# Patient Record
Sex: FEMALE | Race: WHITE | NOT HISPANIC OR LATINO | Employment: FULL TIME | URBAN - METROPOLITAN AREA
[De-identification: names, ages, dates, MRNs, and addresses within clinical notes are randomized per-mention and may not be internally consistent; named-entity substitution may affect disease eponyms.]

---

## 2009-07-22 LAB — EXTERNAL HIV SCREEN: NORMAL

## 2019-07-30 RX ORDER — ESCITALOPRAM OXALATE 10 MG/1
10 TABLET ORAL DAILY
COMMUNITY
Start: 2019-05-30 | End: 2020-04-20 | Stop reason: ALTCHOICE

## 2019-07-30 RX ORDER — LISINOPRIL 10 MG/1
10 TABLET ORAL DAILY
COMMUNITY
Start: 2019-03-01 | End: 2020-08-17 | Stop reason: SDUPTHER

## 2019-07-30 RX ORDER — PNV NO.95/FERROUS FUM/FOLIC AC 28MG-0.8MG
2 TABLET ORAL DAILY
COMMUNITY

## 2019-07-31 ENCOUNTER — OFFICE VISIT (OUTPATIENT)
Dept: FAMILY MEDICINE CLINIC | Facility: CLINIC | Age: 44
End: 2019-07-31
Payer: COMMERCIAL

## 2019-07-31 VITALS
RESPIRATION RATE: 12 BRPM | OXYGEN SATURATION: 99 % | WEIGHT: 196.8 LBS | DIASTOLIC BLOOD PRESSURE: 72 MMHG | SYSTOLIC BLOOD PRESSURE: 124 MMHG | TEMPERATURE: 98.8 F | BODY MASS INDEX: 32.79 KG/M2 | HEIGHT: 65 IN | HEART RATE: 76 BPM

## 2019-07-31 DIAGNOSIS — I10 ESSENTIAL HYPERTENSION: ICD-10-CM

## 2019-07-31 DIAGNOSIS — E04.2 MULTIPLE THYROID NODULES: ICD-10-CM

## 2019-07-31 DIAGNOSIS — Z00.00 PHYSICAL EXAM, ANNUAL: Primary | ICD-10-CM

## 2019-07-31 DIAGNOSIS — F41.9 ANXIETY: ICD-10-CM

## 2019-07-31 PROBLEM — D68.51 FACTOR 5 LEIDEN MUTATION, HETEROZYGOUS (HCC): Status: ACTIVE | Noted: 2019-03-04

## 2019-07-31 PROCEDURE — 99396 PREV VISIT EST AGE 40-64: CPT | Performed by: FAMILY MEDICINE

## 2019-07-31 RX ORDER — DIPHENOXYLATE HYDROCHLORIDE AND ATROPINE SULFATE 2.5; .025 MG/1; MG/1
1 TABLET ORAL DAILY
COMMUNITY

## 2020-04-20 ENCOUNTER — TELEPHONE (OUTPATIENT)
Dept: ADMINISTRATIVE | Facility: OTHER | Age: 45
End: 2020-04-20

## 2020-04-20 ENCOUNTER — TELEMEDICINE (OUTPATIENT)
Dept: FAMILY MEDICINE CLINIC | Facility: CLINIC | Age: 45
End: 2020-04-20
Payer: COMMERCIAL

## 2020-04-20 ENCOUNTER — TELEPHONE (OUTPATIENT)
Dept: FAMILY MEDICINE CLINIC | Facility: CLINIC | Age: 45
End: 2020-04-20

## 2020-04-20 VITALS — TEMPERATURE: 99 F | BODY MASS INDEX: 32.65 KG/M2 | HEIGHT: 65 IN | WEIGHT: 196 LBS

## 2020-04-20 DIAGNOSIS — R50.9 FEVER, UNSPECIFIED FEVER CAUSE: ICD-10-CM

## 2020-04-20 DIAGNOSIS — R50.9 FEVER, UNSPECIFIED FEVER CAUSE: Primary | ICD-10-CM

## 2020-04-20 DIAGNOSIS — Z20.828 EXPOSURE TO SARS-ASSOCIATED CORONAVIRUS: ICD-10-CM

## 2020-04-20 PROCEDURE — 87635 SARS-COV-2 COVID-19 AMP PRB: CPT

## 2020-04-20 PROCEDURE — 99213 OFFICE O/P EST LOW 20 MIN: CPT | Performed by: FAMILY MEDICINE

## 2020-04-21 LAB — SARS-COV-2 RNA SPEC QL NAA+PROBE: NOT DETECTED

## 2020-05-12 ENCOUNTER — TELEMEDICINE (OUTPATIENT)
Dept: FAMILY MEDICINE CLINIC | Facility: CLINIC | Age: 45
End: 2020-05-12
Payer: COMMERCIAL

## 2020-05-12 VITALS — TEMPERATURE: 98.9 F | HEIGHT: 65 IN | WEIGHT: 204.5 LBS | BODY MASS INDEX: 34.07 KG/M2

## 2020-05-12 DIAGNOSIS — M54.6 ACUTE RIGHT-SIDED THORACIC BACK PAIN: Primary | ICD-10-CM

## 2020-05-12 PROCEDURE — 99213 OFFICE O/P EST LOW 20 MIN: CPT | Performed by: FAMILY MEDICINE

## 2020-05-12 RX ORDER — CYCLOBENZAPRINE HCL 10 MG
10 TABLET ORAL 3 TIMES DAILY PRN
Qty: 30 TABLET | Refills: 0 | Status: SHIPPED | OUTPATIENT
Start: 2020-05-12 | End: 2021-08-04

## 2020-06-29 ENCOUNTER — TRANSCRIBE ORDERS (OUTPATIENT)
Dept: ADMINISTRATIVE | Facility: HOSPITAL | Age: 45
End: 2020-06-29

## 2020-06-29 DIAGNOSIS — Z12.31 VISIT FOR SCREENING MAMMOGRAM: Primary | ICD-10-CM

## 2020-07-10 ENCOUNTER — HOSPITAL ENCOUNTER (OUTPATIENT)
Dept: ULTRASOUND IMAGING | Facility: MEDICAL CENTER | Age: 45
Discharge: HOME | End: 2020-07-10
Payer: COMMERCIAL

## 2020-07-10 DIAGNOSIS — E04.2 MULTIPLE THYROID NODULES: ICD-10-CM

## 2020-07-10 PROCEDURE — 76536 US EXAM OF HEAD AND NECK: CPT

## 2020-07-24 ENCOUNTER — TELEPHONE (OUTPATIENT)
Dept: FAMILY MEDICINE CLINIC | Facility: CLINIC | Age: 45
End: 2020-07-24

## 2020-07-24 NOTE — TELEPHONE ENCOUNTER
April from [facility] needs a referral for PT faxed to the office at [phone]    Cassy's appointment is next Wednesday 7/29/20    Caller: April  Phone#: [phone]

## 2020-07-27 DIAGNOSIS — M54.6 ACUTE RIGHT-SIDED THORACIC BACK PAIN: Primary | ICD-10-CM

## 2020-07-27 NOTE — TELEPHONE ENCOUNTER
Okay to place referral? Reviewed chart no information about a PT referral needing to placed  Please advise

## 2020-08-11 ENCOUNTER — OFFICE VISIT (OUTPATIENT)
Dept: FAMILY MEDICINE CLINIC | Facility: CLINIC | Age: 45
End: 2020-08-11
Payer: COMMERCIAL

## 2020-08-11 VITALS
BODY MASS INDEX: 33.15 KG/M2 | SYSTOLIC BLOOD PRESSURE: 122 MMHG | RESPIRATION RATE: 16 BRPM | WEIGHT: 199 LBS | OXYGEN SATURATION: 96 % | HEART RATE: 64 BPM | TEMPERATURE: 98 F | DIASTOLIC BLOOD PRESSURE: 80 MMHG | HEIGHT: 65 IN

## 2020-08-11 DIAGNOSIS — Z00.00 PHYSICAL EXAM, ANNUAL: Primary | ICD-10-CM

## 2020-08-11 DIAGNOSIS — I10 ESSENTIAL HYPERTENSION: ICD-10-CM

## 2020-08-11 DIAGNOSIS — F41.9 ANXIETY: ICD-10-CM

## 2020-08-11 DIAGNOSIS — D68.51 FACTOR 5 LEIDEN MUTATION, HETEROZYGOUS (HCC): ICD-10-CM

## 2020-08-11 DIAGNOSIS — E04.2 MULTIPLE THYROID NODULES: ICD-10-CM

## 2020-08-11 DIAGNOSIS — Z12.4 CERVICAL CANCER SCREENING: ICD-10-CM

## 2020-08-11 DIAGNOSIS — Z80.0 FAMILY HISTORY OF LYNCH SYNDROME: ICD-10-CM

## 2020-08-11 PROCEDURE — 99396 PREV VISIT EST AGE 40-64: CPT | Performed by: FAMILY MEDICINE

## 2020-08-11 PROCEDURE — 3074F SYST BP LT 130 MM HG: CPT | Performed by: FAMILY MEDICINE

## 2020-08-11 PROCEDURE — 3079F DIAST BP 80-89 MM HG: CPT | Performed by: FAMILY MEDICINE

## 2020-08-11 PROCEDURE — 3725F SCREEN DEPRESSION PERFORMED: CPT | Performed by: FAMILY MEDICINE

## 2020-08-11 PROCEDURE — 3008F BODY MASS INDEX DOCD: CPT | Performed by: FAMILY MEDICINE

## 2020-08-11 PROCEDURE — 1036F TOBACCO NON-USER: CPT | Performed by: FAMILY MEDICINE

## 2020-08-11 RX ORDER — ESCITALOPRAM OXALATE 10 MG/1
10 TABLET ORAL DAILY
COMMUNITY
Start: 2020-07-22 | End: 2020-08-11

## 2020-08-11 NOTE — PROGRESS NOTES
BMI Counseling: Body mass index is 33 28 kg/m²  The BMI is above normal  Nutrition recommendations include decreasing portion sizes and encouraging healthy choices of fruits and vegetables  Exercise recommendations include vigorous physical activity 75 minutes/week  Assessment/Plan:    No problem-specific Assessment & Plan notes found for this encounter  {[facility]28712}      Subjective:      Patient ID: Tarun Luo is a 40 y o  female      HPI  Pt presents for preventive exam    {Common ambulatory SmartLinks:56417}    Review of Systems      Objective:      /80   Pulse 64   Temp 98 °F (36 7 °C)   Resp 16   Ht 5' 4 84" (1 647 m)   Wt 90 3 kg (199 lb)   SpO2 96%   BMI 33 28 kg/m²          Physical Exam

## 2020-08-11 NOTE — PROGRESS NOTES
BMI Counseling: Body mass index is 33 28 kg/m²  The BMI is above normal  Nutrition recommendations include decreasing portion sizes and encouraging healthy choices of fruits and vegetables  Exercise recommendations include vigorous physical activity 75 minutes/week  Assessment/Plan:    No problem-specific Assessment & Plan notes found for this encounter  Diagnoses and all orders for this visit:    Physical exam, annual  Comments:  preventively, due for gyn--referral given  continue working on exercise and weight loss  flu shot in the fall  labs as ordered    Cervical cancer screening  -     Ambulatory referral to Obstetrics / Gynecology; Future    Anxiety  Comments:  doing well off meds  continue to monitor    Essential hypertension  Comments:  well-controlled on current meds  continue same  Orders:  -     CBC and differential; Future  -     Comprehensive metabolic panel; Future  -     Hemoglobin A1C; Future  -     Lipid panel; Future    Multiple thyroid nodules  Comments:  stable US  labs as ordered  Orders:  -     TSH, 3rd generation with Free T4 reflex; Future  -     Anti-microsomal antibody; Future    Factor 5 Leiden mutation, heterozygous ([address] )  Comments:  stable  no a/c indicated     Family history of White syndrome  Comments:  refer to genetics    Other orders  -     Discontinue: escitalopram (LEXAPRO) 10 mg tablet; Take 10 mg by mouth daily        Subjective:      Patient ID: Timo Parker is a 40 y o  female  HPI  Pt presents for preventive exam   Working on weight loss with noom  Exercising  Seeing dentist   Due for labs  Her cousin was just diagnosed with white syndrome  Her uncle had  as a young man of cancer  Pt is tolerating her lisinopril  No cough, chest pain, shortness of breath  Needs new gyn and would like referral    She stopped lexapro and is feeling well  No increased anxiety at present          The following portions of the patient's history were reviewed and updated as appropriate: allergies, current medications, past family history, past medical history, past social history, past surgical history and problem list     Review of Systems   Constitutional: Negative for chills, fatigue, fever and unexpected weight change  HENT: Negative for congestion, ear pain, hearing loss, postnasal drip, rhinorrhea, sinus pressure, sinus pain, sore throat, trouble swallowing and voice change  Eyes: Negative for pain, redness and visual disturbance  Respiratory: Negative for cough and shortness of breath  Cardiovascular: Negative for chest pain, palpitations and leg swelling  Gastrointestinal: Negative for abdominal pain, constipation, diarrhea and nausea  Endocrine: Negative for cold intolerance, heat intolerance, polydipsia and polyuria  Genitourinary: Negative for dysuria, frequency and urgency  Musculoskeletal: Negative for arthralgias, joint swelling and myalgias  Skin: Negative for rash  No suspicious lesions   Neurological: Negative for weakness, numbness and headaches  Hematological: Negative for adenopathy  Objective:      /80   Pulse 64   Temp 98 °F (36 7 °C)   Resp 16   Ht 5' 4 84" (1 647 m)   Wt 90 3 kg (199 lb)   SpO2 96%   BMI 33 28 kg/m²          Physical Exam  Constitutional:       General: She is not in acute distress  Appearance: She is well-developed  HENT:      Head: Normocephalic and atraumatic  Right Ear: Tympanic membrane, ear canal and external ear normal       Left Ear: Tympanic membrane, ear canal and external ear normal       Nose: Nose normal       Mouth/Throat:      Pharynx: No oropharyngeal exudate  Eyes:      Conjunctiva/sclera: Conjunctivae normal       Pupils: Pupils are equal, round, and reactive to light  Neck:      Thyroid: No thyromegaly  Vascular: No carotid bruit or JVD  Cardiovascular:      Rate and Rhythm: Regular rhythm  Heart sounds: S1 normal and S2 normal  No murmur  No friction rub  No gallop  No S3 or S4 sounds  Pulmonary:      Effort: Pulmonary effort is normal       Breath sounds: Normal breath sounds  No wheezing, rhonchi or rales  Abdominal:      General: Bowel sounds are normal  There is no distension  Palpations: Abdomen is soft  Tenderness: There is no abdominal tenderness  Lymphadenopathy:      Cervical: No cervical adenopathy  Neurological:      Mental Status: She is alert and oriented to person, place, and time  Cranial Nerves: No cranial nerve deficit  Sensory: No sensory deficit  Deep Tendon Reflexes: Reflexes are normal and symmetric  Psychiatric:         Mood and Affect: Mood normal          Behavior: Behavior normal          Thought Content:  Thought content normal          Judgment: Judgment normal

## 2020-08-17 ENCOUNTER — PATIENT MESSAGE (OUTPATIENT)
Dept: FAMILY MEDICINE CLINIC | Facility: CLINIC | Age: 45
End: 2020-08-17

## 2020-08-17 DIAGNOSIS — I10 ESSENTIAL HYPERTENSION: Primary | ICD-10-CM

## 2020-08-17 NOTE — TELEPHONE ENCOUNTER
From: Fabiana Burden  To: Nan Layne DO  Sent: 8/17/2020 11:38 AM EDT  Subject: Non-Urgent Medical Question    Hello,  I need a refill for my Lisinopril medication  This will be new with Dr Jonnie Montanez in the [facility] practice   Would you please send it to [address], [address] [place] [address] # [phone]

## 2020-08-17 NOTE — TELEPHONE ENCOUNTER
Medication: Lisinopril 10 mg   Last refilled: 7/30/19  Last Office Visit: 8/11/20  Next Office Visit: 8/13/21  Pharmacy:

## 2020-08-18 RX ORDER — LISINOPRIL 10 MG/1
10 TABLET ORAL DAILY
Qty: 90 TABLET | Refills: 1 | Status: SHIPPED | OUTPATIENT
Start: 2020-08-18 | End: 2020-08-19 | Stop reason: SDUPTHER

## 2020-08-19 DIAGNOSIS — I10 ESSENTIAL HYPERTENSION: ICD-10-CM

## 2020-08-19 NOTE — TELEPHONE ENCOUNTER
Medication: Lisinopril 10 mg   Last refilled: 8/18/20  Last Office Visit: 8/11/20  Next Office Visit: 8/13/21  Pharmacy:

## 2020-08-21 RX ORDER — LISINOPRIL 10 MG/1
10 TABLET ORAL DAILY
Qty: 90 TABLET | Refills: 0 | Status: SHIPPED | OUTPATIENT
Start: 2020-08-21 | End: 2021-02-26

## 2020-09-17 ENCOUNTER — PATIENT MESSAGE (OUTPATIENT)
Dept: FAMILY MEDICINE CLINIC | Facility: CLINIC | Age: 45
End: 2020-09-17

## 2020-09-17 DIAGNOSIS — Z80.9 FAMILY HISTORY OF CANCER: Primary | ICD-10-CM

## 2020-09-24 ENCOUNTER — HOSPITAL ENCOUNTER (OUTPATIENT)
Dept: MAMMOGRAPHY | Facility: MEDICAL CENTER | Age: 45
Discharge: HOME | End: 2020-09-24
Payer: COMMERCIAL

## 2020-09-24 ENCOUNTER — TELEPHONE (OUTPATIENT)
Dept: SURGICAL ONCOLOGY | Facility: CLINIC | Age: 45
End: 2020-09-24

## 2020-09-24 VITALS — WEIGHT: 199 LBS | BODY MASS INDEX: 33.15 KG/M2 | HEIGHT: 65 IN

## 2020-09-24 DIAGNOSIS — Z12.31 VISIT FOR SCREENING MAMMOGRAM: ICD-10-CM

## 2020-09-24 PROCEDURE — 77067 SCR MAMMO BI INCL CAD: CPT

## 2020-09-24 PROCEDURE — 77063 BREAST TOMOSYNTHESIS BI: CPT

## 2020-09-24 NOTE — TELEPHONE ENCOUNTER
Genetics New Patient Intake Form   Patient Details:     Ezekiel Jimenez    1975    [id number]    Background Information:    "On Hold (Urgent Slot)" is set aside for Pancreatic, Ovarian, and Scheduled Surgery Patients  If it is not scheduled by the previous Friday, any patient can be scheduled in it  Who is calling to schedule? If not self, relationship to patient? self   Referring Provider Dr Jorge Montenegro   Is this a personal or family history?  family   If personal history, what age were you at diagnosis? na   What is the type of tumor?   color[place] syndrome and colon    Pancreatic and Ovarian needs to be scheduled in the "On Hold (Urgent Slot)"   Do you have a pending surgery for your cancer diagnosis? NA    If yes: needs to be scheduled in the "On Hold (Urgent Slot)"   Referring Provider Department pcp   Did the patient schedule an appointment? Yes   List Appointment Date and Provider Name  Elysebrittany Constantin 12/16   Scheduling Information:   Preferred [place]:  [facility]   Are there any dates/time the patient cannot be seen?  na   Miscellaneous: na   After completing the above information, please route to Oncology Genetics

## 2020-10-10 ENCOUNTER — NURSE TRIAGE (OUTPATIENT)
Dept: OTHER | Facility: OTHER | Age: 45
End: 2020-10-10

## 2020-10-10 DIAGNOSIS — Z20.828 EXPOSURE TO SARS-ASSOCIATED CORONAVIRUS: Primary | ICD-10-CM

## 2020-10-12 DIAGNOSIS — Z20.828 EXPOSURE TO SARS-ASSOCIATED CORONAVIRUS: ICD-10-CM

## 2020-10-12 PROCEDURE — U0003 INFECTIOUS AGENT DETECTION BY NUCLEIC ACID (DNA OR RNA); SEVERE ACUTE RESPIRATORY SYNDROME CORONAVIRUS 2 (SARS-COV-2) (CORONAVIRUS DISEASE [COVID-19]), AMPLIFIED PROBE TECHNIQUE, MAKING USE OF HIGH THROUGHPUT TECHNOLOGIES AS DESCRIBED BY CMS-2020-01-R: HCPCS | Performed by: FAMILY MEDICINE

## 2020-10-13 LAB — SARS-COV-2 RNA SPEC QL NAA+PROBE: NOT DETECTED

## 2020-11-11 ENCOUNTER — PATIENT MESSAGE (OUTPATIENT)
Dept: FAMILY MEDICINE CLINIC | Facility: CLINIC | Age: 45
End: 2020-11-11

## 2020-11-12 LAB
ALBUMIN SERPL-MCNC: 4.1 G/DL (ref 3.6–5.1)
ALBUMIN/GLOB SERPL: 1.6 (CALC) (ref 1–2.5)
ALP SERPL-CCNC: 37 U/L (ref 31–125)
ALT SERPL-CCNC: 11 U/L (ref 6–29)
AST SERPL-CCNC: 14 U/L (ref 10–30)
BASOPHILS # BLD AUTO: 19 CELLS/UL (ref 0–200)
BASOPHILS NFR BLD AUTO: 0.4 %
BILIRUB SERPL-MCNC: 0.4 MG/DL (ref 0.2–1.2)
BUN SERPL-MCNC: 17 MG/DL (ref 7–25)
BUN/CREAT SERPL: NORMAL (CALC) (ref 6–22)
CALCIUM SERPL-MCNC: 9.2 MG/DL (ref 8.6–10.2)
CHLORIDE SERPL-SCNC: 107 MMOL/L (ref 98–110)
CHOLEST SERPL-MCNC: 161 MG/DL
CHOLEST/HDLC SERPL: 4.7 (CALC)
CO2 SERPL-SCNC: 29 MMOL/L (ref 20–32)
CREAT SERPL-MCNC: 0.89 MG/DL (ref 0.5–1.1)
EOSINOPHIL # BLD AUTO: 89 CELLS/UL (ref 15–500)
EOSINOPHIL NFR BLD AUTO: 1.9 %
ERYTHROCYTE [DISTWIDTH] IN BLOOD BY AUTOMATED COUNT: 12.9 % (ref 11–15)
GLOBULIN SER CALC-MCNC: 2.5 G/DL (CALC) (ref 1.9–3.7)
GLUCOSE SERPL-MCNC: 94 MG/DL (ref 65–99)
HBA1C MFR BLD: 5.5 % OF TOTAL HGB
HCT VFR BLD AUTO: 41.9 % (ref 35–45)
HDLC SERPL-MCNC: 34 MG/DL
HGB BLD-MCNC: 13.3 G/DL (ref 11.7–15.5)
LDLC SERPL CALC-MCNC: 106 MG/DL (CALC)
LYMPHOCYTES # BLD AUTO: 1645 CELLS/UL (ref 850–3900)
LYMPHOCYTES NFR BLD AUTO: 35 %
MCH RBC QN AUTO: 27.8 PG (ref 27–33)
MCHC RBC AUTO-ENTMCNC: 31.7 G/DL (ref 32–36)
MCV RBC AUTO: 87.5 FL (ref 80–100)
MONOCYTES # BLD AUTO: 343 CELLS/UL (ref 200–950)
MONOCYTES NFR BLD AUTO: 7.3 %
NEUTROPHILS # BLD AUTO: 2604 CELLS/UL (ref 1500–7800)
NEUTROPHILS NFR BLD AUTO: 55.4 %
NONHDLC SERPL-MCNC: 127 MG/DL (CALC)
PLATELET # BLD AUTO: 179 THOUSAND/UL (ref 140–400)
PMV BLD REES-ECKER: 13.3 FL (ref 7.5–12.5)
POTASSIUM SERPL-SCNC: 4.5 MMOL/L (ref 3.5–5.3)
PROT SERPL-MCNC: 6.6 G/DL (ref 6.1–8.1)
RBC # BLD AUTO: 4.79 MILLION/UL (ref 3.8–5.1)
SL AMB EGFR AFRICAN AMERICAN: 91 ML/MIN/1.73M2
SL AMB EGFR NON AFRICAN AMERICAN: 79 ML/MIN/1.73M2
SODIUM SERPL-SCNC: 142 MMOL/L (ref 135–146)
THYROPEROXIDASE AB SERPL-ACNC: 1 IU/ML
TRIGL SERPL-MCNC: 113 MG/DL
TSH SERPL-ACNC: 1.07 MIU/L
WBC # BLD AUTO: 4.7 THOUSAND/UL (ref 3.8–10.8)

## 2020-12-09 ENCOUNTER — VBI (OUTPATIENT)
Dept: ADMINISTRATIVE | Facility: OTHER | Age: 45
End: 2020-12-09

## 2020-12-11 ENCOUNTER — TELEPHONE (OUTPATIENT)
Dept: SURGICAL ONCOLOGY | Facility: CLINIC | Age: 45
End: 2020-12-11

## 2020-12-15 ENCOUNTER — TELEPHONE (OUTPATIENT)
Dept: SURGICAL ONCOLOGY | Facility: CLINIC | Age: 45
End: 2020-12-15

## 2020-12-16 ENCOUNTER — CLINICAL SUPPORT (OUTPATIENT)
Dept: GENETICS | Facility: CLINIC | Age: 45
End: 2020-12-16

## 2020-12-16 DIAGNOSIS — Z84.81 FHX: GENETIC DISEASE CARRIER: ICD-10-CM

## 2020-12-16 DIAGNOSIS — Z80.0 FAMILY HISTORY OF COLON CANCER: ICD-10-CM

## 2020-12-16 DIAGNOSIS — Z80.3 FAMILY HISTORY OF BREAST CANCER: Primary | ICD-10-CM

## 2020-12-16 PROCEDURE — NC001 PR NO CHARGE: Performed by: GENETIC COUNSELOR, MS

## 2021-01-11 ENCOUNTER — OFFICE VISIT (OUTPATIENT)
Dept: OBGYN CLINIC | Facility: CLINIC | Age: 46
End: 2021-01-11
Payer: COMMERCIAL

## 2021-01-11 VITALS
SYSTOLIC BLOOD PRESSURE: 126 MMHG | DIASTOLIC BLOOD PRESSURE: 74 MMHG | WEIGHT: 201 LBS | HEIGHT: 65 IN | BODY MASS INDEX: 33.49 KG/M2

## 2021-01-11 DIAGNOSIS — Z01.419 WELL FEMALE EXAM WITH ROUTINE GYNECOLOGICAL EXAM: Primary | ICD-10-CM

## 2021-01-11 DIAGNOSIS — Z12.4 ENCOUNTER FOR SCREENING FOR CERVICAL CANCER: ICD-10-CM

## 2021-01-11 PROCEDURE — 3074F SYST BP LT 130 MM HG: CPT | Performed by: OBSTETRICS & GYNECOLOGY

## 2021-01-11 PROCEDURE — 1036F TOBACCO NON-USER: CPT | Performed by: OBSTETRICS & GYNECOLOGY

## 2021-01-11 PROCEDURE — 87624 HPV HI-RISK TYP POOLED RSLT: CPT | Performed by: OBSTETRICS & GYNECOLOGY

## 2021-01-11 PROCEDURE — 3008F BODY MASS INDEX DOCD: CPT | Performed by: OBSTETRICS & GYNECOLOGY

## 2021-01-11 PROCEDURE — 99386 PREV VISIT NEW AGE 40-64: CPT | Performed by: OBSTETRICS & GYNECOLOGY

## 2021-01-11 PROCEDURE — 3078F DIAST BP <80 MM HG: CPT | Performed by: OBSTETRICS & GYNECOLOGY

## 2021-01-11 PROCEDURE — G0145 SCR C/V CYTO,THINLAYER,RESCR: HCPCS | Performed by: OBSTETRICS & GYNECOLOGY

## 2021-01-11 NOTE — PROGRESS NOTES
ASSESSMENT & PLAN: Vivian Beltre is a 39 y o  [id number] with normal gynecologic exam     1   Routine well woman exam done today  2   Pap and HPV: Pap with HPV was done today  Current ASCCP Guidelines reviewed  3   Mammogram up to date  Recommend yearly mammography  4   Family history of White syndrome: has seen genetic counseling and has not yet done testing  If positive, would need to discuss options of prophylactic surgery vs early cancer screening  5  The patient is sexually active  She relies on her partner's vasectomy for contraception and options have been discussed  6  The following were reviewed in today's visit: breast self exam, menopause, exercise and healthy diet  7  Patient to return to office in 12 months for annual exam      All questions have been answered to her satisfaction  CC:  Annual Gynecologic Examination    HPI: Vivian Beltre is a 39 y o  [id number] who presents for annual gynecologic examination  She has the following concerns:  Heavy periods  Still regular  Denies intermenstrual bleeding  She does not report hot flashes, night sweats, other symptoms of menopause  She does not report urinary incontinence  Health Maintenance:    She exercises 3 days per week - gym  She wears her seatbelt routinely  She does not perform regular monthly self breast exams  She feels safe at home  She does follow a balanced diet      She does not use tobacco    Past Medical History:   Diagnosis Date    Allergic     [place], soy, seasonal, Peanuts hurt my stomach     Allergic rhinitis     Anxiety 2004    Depression 2018    [facility], [facility]     Generalized headaches     More frequent around my period    Hypertension 2004    Low back pain 01/2017    Varicella        Past Surgical History:   Procedure Laterality Date    VAGINAL DELIVERY      WISDOM TOOTH EXTRACTION         Past OB/Gyn History:  Period Cycle (Days): 28  Period Duration (Days): 6  Period Pattern: Regular  Menstrual Flow: Heavy  Menstrual Control: Maxi pad, Thin pad  Dysmenorrhea: (!) MildPatient's last menstrual period was 2021 (exact date)      Patient is currently sexually active: heterosexual  Birth control:vasectomy      Last Pap  2013 : no abnormalities per patient  No history of abnormal paps    OB History    Para Term  AB Living   3 2 2 0 1 0   SAB TAB Ectopic Multiple Live Births   1 0 0 0 0      # Outcome Date GA Lbr Dev/2nd Weight Sex Delivery Anes PTL Lv   3 SAB            2 Term            1 Term                Family History  Family History   Problem Relation Age of Onset    Coronary artery disease Father     Diabetes Father     Hypothyroidism Mother     Breast cancer Mother 61    Clotting disorder Sister     ADD / ADHD Son     Asthma Son     No Known Problems Daughter     No Known Problems Maternal Grandmother     No Known Problems Maternal Grandfather     No Known Problems Paternal Grandmother     No Known Problems Paternal Grandfather     No Known Problems Sister     Throat cancer Maternal Aunt         age unknown    Melanoma Maternal Aunt 39    No Known Problems Maternal Aunt        Social History:  Social History     Socioeconomic History    Marital status: /Civil Union     Spouse name: Alexsander Hwang Number of children: 2    Years of education: Not on file    Highest education level: Not on file   Occupational History    Not on file   Social Needs    Financial resource strain: Not on file    Food insecurity     Worry: Not on file     Inability: Not on file   Yumit needs     Medical: Not on file     Non-medical: Not on file   Tobacco Use    Smoking status: Former Smoker     Packs/day: 0 50     Years: 4 00     Pack years: 2 00     Types: Cigarettes     Quit date: 1997     Years since quittin 0    Smokeless tobacco: Never Used   Substance and Sexual Activity    Alcohol use: Not Currently    Drug use: Never    Sexual activity: Yes     Partners: Male     Birth control/protection: Male Sterilization   Lifestyle    Physical activity     Days per week: Not on file     Minutes per session: Not on file    Stress: Not on file   Relationships    Social connections     Talks on phone: Not on file     Gets together: Not on file     Attends Alevism service: Not on file     Active member of club or organization: Not on file     Attends meetings of clubs or organizations: Not on file     Relationship status: Not on file    Intimate partner violence     Fear of current or ex partner: Not on file     Emotionally abused: Not on file     Physically abused: Not on file     Forced sexual activity: Not on file   Other Topics Concern    Not on file   Social History Narrative    Not on file     Presently lives with her  and daughter and son  Patient is   Patient is currently employed  Allergies: Allergies   Allergen Reactions    Other Hives and Other (See Comments)     Almonds - causes oral pain   Oranges - Hives    Hanover Oil Other (See Comments)    Latex        Medications:    Current Outpatient Medications:     lisinopril (ZESTRIL) 10 mg tablet, Take 1 tablet (10 mg total) by mouth daily, Disp: 90 tablet, Rfl: 0    multivitamin (THERAGRAN) TABS, Take 1 tablet by mouth daily, Disp: , Rfl:     Omega-3 Fatty Acids (FISH OIL OMEGA-3) 1000 MG CAPS, Take 2 g by mouth daily, Disp: , Rfl:     Probiotic Product (PROBIOTIC-10) CAPS, Take 1 capsule by mouth daily , Disp: , Rfl:     cyclobenzaprine (FLEXERIL) 10 mg tablet, Take 1 tablet (10 mg total) by mouth 3 (three) times a day as needed for muscle spasms (Patient not taking: Reported on 1/11/2021), Disp: 30 tablet, Rfl: 0    Review of Systems:  Denies fevers, chills, unintentional weight loss, excessive fatigue, chest pain, shortness of breath, abdominal pain, nausea, vomiting, urinary incontinence, urinary frequency, vaginal bleeding, vaginal discharge   All other systems negative unless otherwise stated  Physical Exam:  /74   Ht 5' 4 75" (1 645 m)   Wt 91 2 kg (201 lb)   LMP 01/09/2021 (Exact Date)   BMI 33 71 kg/m²  Body mass index is 33 71 kg/m²  GEN: The patient was alert and oriented x3, pleasant well-appearing female in no acute distress  HEENT:  Unremarkable, no anterior or posterior lymphadenopathy, no thyromegaly  CV:  Regular rate and rhythm, normal S1 and S2, no murmurs  RESP:  Clear to auscultation bilaterally, no wheezes, rales or rhonchi  BREAST:  Symmetric breasts with no palpable breast masses or obvious breast lesions  She has no retractions or nipple discharge  She has no axillary abnormalities or palpable masses  GI:  Soft, nontender, non-distended  MSK: bilateral lower extremities are nontender, no edema  : Normal appearing external female genitalia, normal appearing urethral meatus  On sterile speculum exam, normal appearing vaginal epithelium, no vaginal discharge, no bleeding, grossly normal appearing cervix  On bimanual exam, no cervical motion tenderness; uterus is smooth, mobile, nontender 6 weeks size  No tenderness or fullness in the bilateral adnexa

## 2021-01-15 ENCOUNTER — IMMUNIZATIONS (OUTPATIENT)
Dept: FAMILY MEDICINE CLINIC | Facility: HOSPITAL | Age: 46
End: 2021-01-15

## 2021-01-15 DIAGNOSIS — Z23 ENCOUNTER FOR IMMUNIZATION: Primary | ICD-10-CM

## 2021-01-15 LAB
LAB AP GYN PRIMARY INTERPRETATION: NORMAL
Lab: NORMAL

## 2021-01-15 PROCEDURE — 0001A SARS-COV-2 / COVID-19 MRNA VACCINE (PFIZER-BIONTECH) 30 MCG: CPT

## 2021-01-15 PROCEDURE — 91300 SARS-COV-2 / COVID-19 MRNA VACCINE (PFIZER-BIONTECH) 30 MCG: CPT

## 2021-01-16 LAB
HPV HR 12 DNA CVX QL NAA+PROBE: NEGATIVE
HPV16 DNA CVX QL NAA+PROBE: NEGATIVE
HPV18 DNA CVX QL NAA+PROBE: NEGATIVE

## 2021-01-27 ENCOUNTER — PATIENT MESSAGE (OUTPATIENT)
Dept: FAMILY MEDICINE CLINIC | Facility: CLINIC | Age: 46
End: 2021-01-27

## 2021-01-28 NOTE — TELEPHONE ENCOUNTER
From: Bree [place] LPN  To: Jayro Daley  Sent: 1/27/2021 11:56 AM EST  Subject: Influenza Vaccine     Hello,     We hope you are doing well  While it is important to receive your flu vaccine yearly, our records show that you have not yet received yours for this year  Please call the office at [phone] if you are interested in scheduling a visit to receive the vaccine  If you have had your flu vaccine outside of our office, please reply to this message with the date and location so we can update your medical record  If you have already received your flu vaccine in our office, please disregard this message            We look forward to hearing from you,    Your Medical Team at [address]

## 2021-02-05 ENCOUNTER — IMMUNIZATIONS (OUTPATIENT)
Dept: FAMILY MEDICINE CLINIC | Facility: HOSPITAL | Age: 46
End: 2021-02-05

## 2021-02-05 DIAGNOSIS — Z23 ENCOUNTER FOR IMMUNIZATION: Primary | ICD-10-CM

## 2021-02-05 PROCEDURE — 0002A SARS-COV-2 / COVID-19 MRNA VACCINE (PFIZER-BIONTECH) 30 MCG: CPT

## 2021-02-05 PROCEDURE — 91300 SARS-COV-2 / COVID-19 MRNA VACCINE (PFIZER-BIONTECH) 30 MCG: CPT

## 2021-02-09 ENCOUNTER — TELEPHONE (OUTPATIENT)
Dept: GENETICS | Facility: CLINIC | Age: 46
End: 2021-02-09

## 2021-02-09 NOTE — TELEPHONE ENCOUNTER
Post-Test Genetic Counseling Consult Note  Today I spoke with Cristhian Lance over the phone to review the results of her negative  genetic test for hereditary cancer  We met previously on 12/16/2020 for pre-test counseling  SUMMARY:    Test(s): CancerNext (36 genes): APC, PRASANNA, AXIN2 BARD1, BRCA1, BRCA2, BRIP1, BMPR1A, CDH1, CDK4, CDKN2A, CHEK2, DICER1, EPCAM, GREM1, HOXB13, MLH1, MSH2, MSH3, MSH6, MUTYH, NBN, Nf1, NTHL1, PALB2, PMS2, POLD1, POLE, PTEN, RAD51C, RAD51D, RECQL SMAD4, SMARCA4, STK11, TP53    Result: Negative - No Clinically Significant Variants Detected      COMMENT: The MLH1 "c 67G>T (p Glu23*)" alteration, which was previously identified in this individual's relative(s), was not detected in Cassy's specimen  Assessment:   A negative result significantly reduces the likelihood that Cristhian Lance has a hereditary cancer syndrome  However, this testing is unable to completely rule out the presence of hereditary cancer  It remains possible that:  - There is a variant in an area of a gene which was not tested or there is a variant not detectable due to technical limitations of this test      - There is a variant in another gene that was not included in this test or in a gene not known to be linked to cancer or tumors  - A family member has a genetic variant that the patient did not inherit  - The cancer in the family is sporadic and is related to non-hereditary factors  Cassy's risk of breast cancer is increased based on the family history  Despite a negative test result, we are able to run risk models to better estimate Cassy's lifetime risk of developing breast cancer   I ran three risk models based on the information Cristhian Lance provided during our pre-test counseling session:    Gaviner-Rachelck model: Estimated lifetime risk, to age 80, for breast cancer is 18 2% compared to approximately 12% general population risk     Jammie Easley model:Estimated lifetime risk, to age 80, for breast cancer is 18 4% compared to approximately 10 5% general population risk     Laci tables: Estimated lifetime risk, to age 78, for breast cancer is 9 6%    We do not recommend additional breast cancer screening at this time based on the risks outlined above  Testing for other Family Members: At this time we do not recommend testing for Lashay Mari 's children based on her negative test result  Lashay Mari 's children still need to consider the history of cancer on the other side of their family when determining their risks  We discussed that it remains possible that Cassy's sisters carry the MLH1 familial variant, or a variant in another gene  Cassy's sister's should still consider genetic testing to better understand their risk for developing cancer  Plan:   Recommendations for Lashay Mari are outlined below however the surveillance and medical management should continue as clinically indicated and as determined appropriate by her healthcare providers  Breast Cancer Screening:  Population-based screening guidelines are appropriate  Colon Cancer Screening:   Population-based screening guidelines are appropriate  Gynecologic Cancer Screening/Risk Reduction:  Routine gynecologic screening  There are no standard screening recommendations for endometrial or ovarian cancer  Skin Cancer Screening:  Continue skin cancer screening as recommended by your healthcare provider  Negative Result: Lashay Mari was strongly encouraged to contact us regarding any changes in her personal or family history of cancer as these changes could alter our recommendation regarding genetic testing and/or cancer screening

## 2021-02-26 DIAGNOSIS — I10 ESSENTIAL HYPERTENSION: ICD-10-CM

## 2021-02-26 RX ORDER — LISINOPRIL 10 MG/1
TABLET ORAL
Qty: 90 TABLET | Refills: 0 | Status: SHIPPED | OUTPATIENT
Start: 2021-02-26 | End: 2021-05-28

## 2021-05-28 DIAGNOSIS — I10 ESSENTIAL HYPERTENSION: ICD-10-CM

## 2021-05-28 RX ORDER — LISINOPRIL 10 MG/1
TABLET ORAL
Qty: 90 TABLET | Refills: 0 | Status: SHIPPED | OUTPATIENT
Start: 2021-05-28 | End: 2021-09-17 | Stop reason: SDUPTHER

## 2021-08-06 ENCOUNTER — RA CDI HCC (OUTPATIENT)
Dept: OTHER | Facility: HOSPITAL | Age: 46
End: 2021-08-06

## 2021-08-06 NOTE — PROGRESS NOTES
Based on clinical documentation indicated in your record, it appears that the patient may have the following conditions:     D68 51 Factor 5 Leiden mutation, heterozygous      F33 9 Major depressive disorder, recurrent     If this is correct, please document and assess at your next visit August 13th    [place] [id number]  coding opportunities             Chart reviewed, (number of) suggestions sent to provider: 2                  Patients insurance company: [employer] (Medicare Advantage and Commercial)             [place] [address]  coding opportunities             Chart Reviewed * (Number of) Inbasket suggestions sent to Provider: 2                  Patients insurance company: [employer] (Medicare Advantage and Commercial)     Visit status: Patient canceled the appointment     Provider never responded to [place] [address]  coding request

## 2021-09-17 ENCOUNTER — OFFICE VISIT (OUTPATIENT)
Dept: FAMILY MEDICINE CLINIC | Facility: CLINIC | Age: 46
End: 2021-09-17
Payer: COMMERCIAL

## 2021-09-17 VITALS
BODY MASS INDEX: 33.36 KG/M2 | HEART RATE: 89 BPM | SYSTOLIC BLOOD PRESSURE: 120 MMHG | OXYGEN SATURATION: 99 % | WEIGHT: 200.2 LBS | TEMPERATURE: 98.1 F | RESPIRATION RATE: 18 BRPM | HEIGHT: 65 IN | DIASTOLIC BLOOD PRESSURE: 72 MMHG

## 2021-09-17 DIAGNOSIS — Z11.59 NEED FOR HEPATITIS C SCREENING TEST: ICD-10-CM

## 2021-09-17 DIAGNOSIS — I10 ESSENTIAL HYPERTENSION: ICD-10-CM

## 2021-09-17 DIAGNOSIS — D68.51 FACTOR 5 LEIDEN MUTATION, HETEROZYGOUS (HCC): ICD-10-CM

## 2021-09-17 DIAGNOSIS — Z00.00 PHYSICAL EXAM, ANNUAL: Primary | ICD-10-CM

## 2021-09-17 PROCEDURE — 1036F TOBACCO NON-USER: CPT | Performed by: FAMILY MEDICINE

## 2021-09-17 PROCEDURE — 99396 PREV VISIT EST AGE 40-64: CPT | Performed by: FAMILY MEDICINE

## 2021-09-17 PROCEDURE — 3008F BODY MASS INDEX DOCD: CPT | Performed by: FAMILY MEDICINE

## 2021-09-17 PROCEDURE — 3725F SCREEN DEPRESSION PERFORMED: CPT | Performed by: FAMILY MEDICINE

## 2021-09-17 RX ORDER — LISINOPRIL 10 MG/1
10 TABLET ORAL DAILY
Qty: 90 TABLET | Refills: 1 | Status: SHIPPED | OUTPATIENT
Start: 2021-09-17 | End: 2022-05-31 | Stop reason: SDUPTHER

## 2021-09-17 NOTE — PROGRESS NOTES
Assessment/Plan:    No problem-specific Assessment & Plan notes found for this encounter  Diagnoses and all orders for this visit:    Physical exam, annual  Comments:  obtain mammogram  flu shot in october  labs as ordered  work on diet/exercise/weight loss as able  Refer to GI for polyps in her sisters in their 45s  Orders:  -     Hemoglobin A1C; Future  -     Ambulatory referral to Gastroenterology; Future    Essential hypertension  Comments:  well-controlled on current meds  continue same  labs as ordered  Orders:  -     lisinopril (ZESTRIL) 10 mg tablet; Take 1 tablet (10 mg total) by mouth daily  -     Mammo screening bilateral w 3d & cad; Future  -     CBC and differential; Future  -     Comprehensive metabolic panel; Future  -     TSH, 3rd generation; Future  -     Lipid panel; Future    Need for hepatitis C screening test  -     Hepatitis C Antibody ([facility]); Future    Factor 5 Leiden mutation, heterozygous ([facility] [address] )  Comments:  stable  no recent clots        Subjective:      Patient ID: Agustin Taylor is a 39 y o  female  HPI  Pt presents in routine f/u  Doing well in general   No complaints today  Tolerating blood pressure meds  Due for mammogram   Seeing dental and gyn  Up to date on imm's  Will get flu shot in October  Exercising regularly  Pt notices her mood is up and down since stopping lexapro  Struggles a little more, but she doesn't want to restart at this point  She will let me know if this changes  The following portions of the patient's history were reviewed and updated as appropriate: allergies, current medications, past family history, past medical history, past social history, past surgical history and problem list     Review of Systems   Constitutional: Negative for chills, fatigue, fever and unexpected weight change     HENT: Negative for congestion, ear pain, hearing loss, postnasal drip, rhinorrhea, sinus pressure, sinus pain, sore throat, trouble swallowing and voice change  Eyes: Negative for pain, redness and visual disturbance  Respiratory: Negative for cough and shortness of breath  Cardiovascular: Negative for chest pain, palpitations and leg swelling  Gastrointestinal: Negative for abdominal pain, constipation, diarrhea and nausea  Endocrine: Negative for cold intolerance, heat intolerance, polydipsia and polyuria  Genitourinary: Negative for dysuria, frequency and urgency  Musculoskeletal: Negative for arthralgias, joint swelling and myalgias  Skin: Negative for rash  No suspicious lesions   Neurological: Negative for weakness, numbness and headaches  Hematological: Negative for adenopathy  Objective:      /72   Pulse 89   Temp 98 1 °F (36 7 °C)   Resp 18   Ht 5' 4 75" (1 645 m)   Wt 90 8 kg (200 lb 3 2 oz)   SpO2 99%   BMI 33 57 kg/m²          Physical Exam  Constitutional:       General: She is not in acute distress  Appearance: She is well-developed  HENT:      Head: Normocephalic and atraumatic  Right Ear: Tympanic membrane, ear canal and external ear normal       Left Ear: Tympanic membrane, ear canal and external ear normal       Nose: Nose normal       Mouth/Throat:      Pharynx: No oropharyngeal exudate  Eyes:      Conjunctiva/sclera: Conjunctivae normal       Pupils: Pupils are equal, round, and reactive to light  Neck:      Thyroid: No thyromegaly  Vascular: No carotid bruit or JVD  Cardiovascular:      Rate and Rhythm: Regular rhythm  Heart sounds: S1 normal and S2 normal  No murmur heard  No friction rub  No gallop  No S3 or S4 sounds  Pulmonary:      Effort: Pulmonary effort is normal       Breath sounds: Normal breath sounds  No wheezing, rhonchi or rales  Abdominal:      General: Bowel sounds are normal  There is no distension  Palpations: Abdomen is soft  Tenderness: There is no abdominal tenderness  Lymphadenopathy:      Cervical: No cervical adenopathy  Neurological:      Mental Status: She is alert and oriented to person, place, and time  Cranial Nerves: No cranial nerve deficit  Sensory: No sensory deficit  Deep Tendon Reflexes: Reflexes are normal and symmetric  BMI Counseling: Body mass index is 33 57 kg/m²  The BMI is above normal  Nutrition recommendations include encouraging healthy choices of fruits and vegetables  Exercise recommendations include vigorous physical activity 75 minutes/week  Rationale for BMI follow-up plan is due to patient being overweight or obese

## 2021-09-17 NOTE — PATIENT INSTRUCTIONS
Continue current meds  F/u shot in October  Labs when able  Mammogram when able  If you'd like to restart lexapro, just let me know  Make appt with Dr Derek Rosa for colonoscopy

## 2021-12-22 ENCOUNTER — TELEMEDICINE (OUTPATIENT)
Dept: FAMILY MEDICINE CLINIC | Facility: CLINIC | Age: 46
End: 2021-12-22
Payer: COMMERCIAL

## 2021-12-22 VITALS — BODY MASS INDEX: 33.32 KG/M2 | HEIGHT: 65 IN | WEIGHT: 200 LBS

## 2021-12-22 DIAGNOSIS — Z20.822 EXPOSURE TO COVID-19 VIRUS: ICD-10-CM

## 2021-12-22 DIAGNOSIS — B34.9 VIRAL INFECTION, UNSPECIFIED: ICD-10-CM

## 2021-12-22 DIAGNOSIS — B00.1 COLD SORE: Primary | ICD-10-CM

## 2021-12-22 PROCEDURE — 87636 SARSCOV2 & INF A&B AMP PRB: CPT | Performed by: FAMILY MEDICINE

## 2021-12-22 PROCEDURE — 99214 OFFICE O/P EST MOD 30 MIN: CPT | Performed by: FAMILY MEDICINE

## 2021-12-22 RX ORDER — VALACYCLOVIR HYDROCHLORIDE 1 G/1
TABLET, FILM COATED ORAL
Qty: 12 TABLET | Refills: 1 | Status: SHIPPED | OUTPATIENT
Start: 2021-12-22 | End: 2022-12-22

## 2021-12-27 ENCOUNTER — PATIENT MESSAGE (OUTPATIENT)
Dept: FAMILY MEDICINE CLINIC | Facility: CLINIC | Age: 46
End: 2021-12-27

## 2021-12-28 ENCOUNTER — TELEMEDICINE (OUTPATIENT)
Dept: FAMILY MEDICINE CLINIC | Facility: CLINIC | Age: 46
End: 2021-12-28
Payer: COMMERCIAL

## 2021-12-28 VITALS — HEIGHT: 65 IN | WEIGHT: 200 LBS | BODY MASS INDEX: 33.32 KG/M2

## 2021-12-28 DIAGNOSIS — J32.9 SINUSITIS, UNSPECIFIED CHRONICITY, UNSPECIFIED LOCATION: ICD-10-CM

## 2021-12-28 DIAGNOSIS — B34.9 VIRAL INFECTION, UNSPECIFIED: Primary | ICD-10-CM

## 2021-12-28 DIAGNOSIS — Z20.822 EXPOSURE TO COVID-19 VIRUS: ICD-10-CM

## 2021-12-28 PROCEDURE — 99214 OFFICE O/P EST MOD 30 MIN: CPT | Performed by: FAMILY MEDICINE

## 2021-12-28 PROCEDURE — 1036F TOBACCO NON-USER: CPT | Performed by: FAMILY MEDICINE

## 2021-12-28 PROCEDURE — 3008F BODY MASS INDEX DOCD: CPT | Performed by: FAMILY MEDICINE

## 2021-12-28 PROCEDURE — 87636 SARSCOV2 & INF A&B AMP PRB: CPT | Performed by: FAMILY MEDICINE

## 2021-12-28 RX ORDER — AMOXICILLIN AND CLAVULANATE POTASSIUM 875; 125 MG/1; MG/1
1 TABLET, FILM COATED ORAL EVERY 12 HOURS SCHEDULED
Qty: 20 TABLET | Refills: 0 | Status: SHIPPED | OUTPATIENT
Start: 2021-12-28 | End: 2022-01-07

## 2022-01-13 ENCOUNTER — OFFICE VISIT (OUTPATIENT)
Dept: FAMILY MEDICINE CLINIC | Facility: CLINIC | Age: 47
End: 2022-01-13
Payer: COMMERCIAL

## 2022-01-13 ENCOUNTER — LAB (OUTPATIENT)
Dept: LAB | Facility: HOSPITAL | Age: 47
End: 2022-01-13
Payer: COMMERCIAL

## 2022-01-13 ENCOUNTER — HOSPITAL ENCOUNTER (OUTPATIENT)
Dept: NON INVASIVE DIAGNOSTICS | Facility: HOSPITAL | Age: 47
Discharge: HOME | End: 2022-01-13
Payer: COMMERCIAL

## 2022-01-13 VITALS
DIASTOLIC BLOOD PRESSURE: 88 MMHG | TEMPERATURE: 97.9 F | HEART RATE: 98 BPM | HEIGHT: 65 IN | OXYGEN SATURATION: 100 % | WEIGHT: 208.2 LBS | RESPIRATION RATE: 16 BRPM | BODY MASS INDEX: 34.69 KG/M2 | SYSTOLIC BLOOD PRESSURE: 142 MMHG

## 2022-01-13 VITALS
BODY MASS INDEX: 35.51 KG/M2 | SYSTOLIC BLOOD PRESSURE: 142 MMHG | HEART RATE: 98 BPM | DIASTOLIC BLOOD PRESSURE: 88 MMHG | HEIGHT: 64 IN | WEIGHT: 208 LBS

## 2022-01-13 DIAGNOSIS — Z92.29 COVID-19 VACCINE SERIES COMPLETED: ICD-10-CM

## 2022-01-13 DIAGNOSIS — Z00.00 PHYSICAL EXAM, ANNUAL: ICD-10-CM

## 2022-01-13 DIAGNOSIS — R68.89 EXERCISE INTOLERANCE: ICD-10-CM

## 2022-01-13 DIAGNOSIS — R00.2 PALPITATIONS: ICD-10-CM

## 2022-01-13 DIAGNOSIS — U07.1 COVID-19: ICD-10-CM

## 2022-01-13 DIAGNOSIS — I10 ESSENTIAL HYPERTENSION: ICD-10-CM

## 2022-01-13 DIAGNOSIS — R00.2 PALPITATIONS: Primary | ICD-10-CM

## 2022-01-13 DIAGNOSIS — R07.9 CHEST PAIN, UNSPECIFIED TYPE: ICD-10-CM

## 2022-01-13 DIAGNOSIS — D68.51 FACTOR 5 LEIDEN MUTATION, HETEROZYGOUS (HCC): ICD-10-CM

## 2022-01-13 DIAGNOSIS — Z11.59 NEED FOR HEPATITIS C SCREENING TEST: ICD-10-CM

## 2022-01-13 PROBLEM — I34.0 MITRAL REGURGITATION: Status: ACTIVE | Noted: 2022-01-13

## 2022-01-13 PROBLEM — I07.1 TRICUSPID REGURGITATION: Status: ACTIVE | Noted: 2022-01-13

## 2022-01-13 LAB
ALBUMIN SERPL BCP-MCNC: 4.1 G/DL (ref 3.5–5)
ALP SERPL-CCNC: 49 U/L (ref 46–116)
ALT SERPL W P-5'-P-CCNC: 29 U/L (ref 12–78)
ANION GAP SERPL CALCULATED.3IONS-SCNC: 4 MMOL/L (ref 4–13)
AORTIC ROOT: 3.1 CM
APICAL FOUR CHAMBER EJECTION FRACTION: 62 %
AST SERPL W P-5'-P-CCNC: 20 U/L (ref 5–45)
BASOPHILS # BLD AUTO: 0.02 THOUSANDS/ΜL (ref 0–0.1)
BASOPHILS NFR BLD AUTO: 0 % (ref 0–1)
BILIRUB SERPL-MCNC: 0.31 MG/DL (ref 0.2–1)
BUN SERPL-MCNC: 15 MG/DL (ref 5–25)
CALCIUM SERPL-MCNC: 8.8 MG/DL (ref 8.3–10.1)
CARDIAC TROPONIN I PNL SERPL HS: <2 NG/L (ref 8–18)
CHLORIDE SERPL-SCNC: 110 MMOL/L (ref 100–108)
CHOLEST SERPL-MCNC: 205 MG/DL
CK MB SERPL-MCNC: <1 % (ref 0–2.5)
CK MB SERPL-MCNC: <1 NG/ML (ref 0–5)
CK SERPL-CCNC: 142 U/L (ref 26–192)
CO2 SERPL-SCNC: 27 MMOL/L (ref 21–32)
CREAT SERPL-MCNC: 0.87 MG/DL (ref 0.6–1.3)
CRP SERPL QL: <3 MG/L
E WAVE DECELERATION TIME: 167 MS
EOSINOPHIL # BLD AUTO: 0.06 THOUSAND/ΜL (ref 0–0.61)
EOSINOPHIL NFR BLD AUTO: 1 % (ref 0–6)
ERYTHROCYTE [DISTWIDTH] IN BLOOD BY AUTOMATED COUNT: 12.6 % (ref 11.6–15.1)
EST. AVERAGE GLUCOSE BLD GHB EST-MCNC: 123 MG/DL
FRACTIONAL SHORTENING: 30 % (ref 28–44)
GFR SERPL CREATININE-BSD FRML MDRD: 80 ML/MIN/1.73SQ M
GLUCOSE P FAST SERPL-MCNC: 108 MG/DL (ref 65–99)
HBA1C MFR BLD: 5.9 %
HCT VFR BLD AUTO: 41.6 % (ref 34.8–46.1)
HCV AB SER QL: NORMAL
HDLC SERPL-MCNC: 35 MG/DL
HGB BLD-MCNC: 13.5 G/DL (ref 11.5–15.4)
IMM GRANULOCYTES # BLD AUTO: 0.02 THOUSAND/UL (ref 0–0.2)
IMM GRANULOCYTES NFR BLD AUTO: 0 % (ref 0–2)
INTERVENTRICULAR SEPTUM IN DIASTOLE (PARASTERNAL SHORT AXIS VIEW): 1.1 CM
LAAS-AP4: 20.9 CM2
LDLC SERPL CALC-MCNC: 122 MG/DL (ref 0–100)
LEFT ATRIUM SIZE: 4.3 CM
LEFT INTERNAL DIMENSION IN SYSTOLE: 4 CM (ref 2.1–4)
LEFT VENTRICULAR INTERNAL DIMENSION IN DIASTOLE: 5.7 CM (ref 5.42–8.07)
LEFT VENTRICULAR POSTERIOR WALL IN END DIASTOLE: 1 CM
LEFT VENTRICULAR STROKE VOLUME: 91 ML
LYMPHOCYTES # BLD AUTO: 1.62 THOUSANDS/ΜL (ref 0.6–4.47)
LYMPHOCYTES NFR BLD AUTO: 30 % (ref 14–44)
MCH RBC QN AUTO: 28.9 PG (ref 26.8–34.3)
MCHC RBC AUTO-ENTMCNC: 32.5 G/DL (ref 31.4–37.4)
MCV RBC AUTO: 89 FL (ref 82–98)
MONOCYTES # BLD AUTO: 0.3 THOUSAND/ΜL (ref 0.17–1.22)
MONOCYTES NFR BLD AUTO: 6 % (ref 4–12)
MV E'TISSUE VEL-SEP: 9 CM/S
MV PEAK A VEL: 0.69 M/S
MV PEAK E VEL: 60 CM/S
MV STENOSIS PRESSURE HALF TIME: 0 MS
NEUTROPHILS # BLD AUTO: 3.34 THOUSANDS/ΜL (ref 1.85–7.62)
NEUTS SEG NFR BLD AUTO: 63 % (ref 43–75)
NONHDLC SERPL-MCNC: 170 MG/DL
NRBC BLD AUTO-RTO: 0 /100 WBCS
NT-PROBNP SERPL-MCNC: 43 PG/ML
PLATELET # BLD AUTO: 198 THOUSANDS/UL (ref 149–390)
PMV BLD AUTO: 12.3 FL (ref 8.9–12.7)
POTASSIUM SERPL-SCNC: 3.7 MMOL/L (ref 3.5–5.3)
PROT SERPL-MCNC: 7.7 G/DL (ref 6.4–8.2)
RBC # BLD AUTO: 4.67 MILLION/UL (ref 3.81–5.12)
RIGHT ATRIAL 2D VOLUME: 39 ML
RIGHT ATRIUM AREA SYSTOLE A4C: 16.5 CM2
RIGHT VENTRICLE ID DIMENSION: 3.2 CM
SL CV LV EF: 65
SL CV PED ECHO LEFT VENTRICLE DIASTOLIC VOLUME (MOD BIPLANE) 2D: 160 ML
SL CV PED ECHO LEFT VENTRICLE SYSTOLIC VOLUME (MOD BIPLANE) 2D: 69 ML
SODIUM SERPL-SCNC: 141 MMOL/L (ref 136–145)
TRIGL SERPL-MCNC: 242 MG/DL
TSH SERPL DL<=0.05 MIU/L-ACNC: 1.17 UIU/ML (ref 0.36–3.74)
WBC # BLD AUTO: 5.36 THOUSAND/UL (ref 4.31–10.16)
Z-SCORE OF LEFT VENTRICULAR DIMENSION IN END SYSTOLE: -1.48

## 2022-01-13 PROCEDURE — 83036 HEMOGLOBIN GLYCOSYLATED A1C: CPT

## 2022-01-13 PROCEDURE — 83880 ASSAY OF NATRIURETIC PEPTIDE: CPT

## 2022-01-13 PROCEDURE — 82553 CREATINE MB FRACTION: CPT

## 2022-01-13 PROCEDURE — 85025 COMPLETE CBC W/AUTO DIFF WBC: CPT

## 2022-01-13 PROCEDURE — 83625 ASSAY OF LDH ENZYMES: CPT

## 2022-01-13 PROCEDURE — 82550 ASSAY OF CK (CPK): CPT

## 2022-01-13 PROCEDURE — 80053 COMPREHEN METABOLIC PANEL: CPT

## 2022-01-13 PROCEDURE — 84443 ASSAY THYROID STIM HORMONE: CPT

## 2022-01-13 PROCEDURE — 93000 ELECTROCARDIOGRAM COMPLETE: CPT | Performed by: FAMILY MEDICINE

## 2022-01-13 PROCEDURE — 93306 TTE W/DOPPLER COMPLETE: CPT

## 2022-01-13 PROCEDURE — 86803 HEPATITIS C AB TEST: CPT

## 2022-01-13 PROCEDURE — 93306 TTE W/DOPPLER COMPLETE: CPT | Performed by: INTERNAL MEDICINE

## 2022-01-13 PROCEDURE — 36415 COLL VENOUS BLD VENIPUNCTURE: CPT

## 2022-01-13 PROCEDURE — 80061 LIPID PANEL: CPT

## 2022-01-13 PROCEDURE — 84484 ASSAY OF TROPONIN QUANT: CPT

## 2022-01-13 PROCEDURE — 83615 LACTATE (LD) (LDH) ENZYME: CPT

## 2022-01-13 PROCEDURE — 99214 OFFICE O/P EST MOD 30 MIN: CPT | Performed by: FAMILY MEDICINE

## 2022-01-13 PROCEDURE — 86140 C-REACTIVE PROTEIN: CPT

## 2022-01-13 NOTE — RESULT ENCOUNTER NOTE
Negative troponin heart muscle enzyme  Negative creatinine kinase general muscle enzyme  Negative pro BNP ,which is a measurement of heart failure  Negative CRP, which is a marker of inflammation  Stable CBC  Slightly elevated fasting blood sugar and slightly elevated chloride  Continue plan of care  Message sent to patient via [employer] patient portal     Nurse to also call patient

## 2022-01-13 NOTE — RESULT ENCOUNTER NOTE
Echocardiogram shows good squeezing and relaxing function of the heart  Left atrium or top chamber is mildly dilated  There is trace regurgitation or backflow of blood in the mitral and tricuspid valves  Continue plan of care  Message sent to patient via [employer] patient portal     Nurse to also call patient

## 2022-01-13 NOTE — ASSESSMENT & PLAN NOTE
Elevated blood pressure in office today  Check blood pressure outside of office  Recommend lifestyle modifications

## 2022-01-13 NOTE — PROGRESS NOTES
Assessment/Plan:  Problem List Items Addressed This Visit        Cardiovascular and Mediastinum    Essential hypertension     Elevated blood pressure in office today  Check blood pressure outside of office  Recommend lifestyle modifications  Relevant Orders    Ambulatory Referral to Cardiology    CBC and differential (Completed)       Hematopoietic and Hemostatic    Factor 5 Leiden mutation, heterozygous ([facility] [address] )      Other Visit Diagnoses     Palpitations    -  Primary    Relevant Orders    POCT ECG (Completed)    Ambulatory Referral to Cardiology    XR chest pa & lateral    Echo complete w/ contrast if indicated (Completed)    CBC and differential (Completed)    C-reactive protein (Completed)    Comprehensive metabolic panel (Completed)    CK (with reflex to MB) (Completed)    High Sensitivity Troponin I Random (Completed)    NT-BNP PRO (Completed)    Lactate dehydrogenase, isoenzymes    Stable EKG  Pending labs  R/O Myocarditis s/p COVID Booster and COVID infection  Normal Sinus Rhythm @ 85bpm   Normal axis and intervals  No acute S-T elevation and depression  Flat T wave in lead III  No prior EKG for comparison          COVID-19        Relevant Orders    Ambulatory Referral to Cardiology    XR chest pa & lateral    Echo complete w/ contrast if indicated (Completed)    CBC and differential (Completed)    C-reactive protein (Completed)    Comprehensive metabolic panel (Completed)    CK (with reflex to MB) (Completed)    High Sensitivity Troponin I Random (Completed)    NT-BNP PRO (Completed)    Lactate dehydrogenase, isoenzymes    COVID-19 vaccine series completed        Relevant Orders    Ambulatory Referral to Cardiology    XR chest pa & lateral    Echo complete w/ contrast if indicated (Completed)    CBC and differential (Completed)    C-reactive protein (Completed)    Comprehensive metabolic panel (Completed)    CK (with reflex to MB) (Completed)    High Sensitivity Troponin I Random (Completed)    NT-BNP PRO (Completed)    Lactate dehydrogenase, isoenzymes    Chest pain, unspecified type        Relevant Orders    Ambulatory Referral to Cardiology    XR chest pa & lateral    Echo complete w/ contrast if indicated (Completed)    CBC and differential (Completed)    C-reactive protein (Completed)    Comprehensive metabolic panel (Completed)    CK (with reflex to MB) (Completed)    High Sensitivity Troponin I Random (Completed)    NT-BNP PRO (Completed)    Lactate dehydrogenase, isoenzymes    Stable EKG  Pending labs  R/O Myocarditis s/p COVID Booster and COVID infection  Normal Sinus Rhythm @ 85bpm   Normal axis and intervals  No acute S-T elevation and depression  Flat T wave in lead III  No prior EKG for comparison  Exercise intolerance        Relevant Orders    Ambulatory Referral to Cardiology    XR chest pa & lateral    Echo complete w/ contrast if indicated (Completed)    CBC and differential (Completed)    C-reactive protein (Completed)    Comprehensive metabolic panel (Completed)    CK (with reflex to MB) (Completed)    High Sensitivity Troponin I Random (Completed)    NT-BNP PRO (Completed)    Lactate dehydrogenase, isoenzymes    Stable EKG  Pending labs  R/O Myocarditis s/p COVID Booster and COVID infection  Normal Sinus Rhythm @ 85bpm   Normal axis and intervals  No acute S-T elevation and depression  Flat T wave in lead III  No prior EKG for comparison  Normal Sinus Rhythm @ 85bpm   Normal axis and intervals  No acute S-T elevation and depression  Flat T wave in lead III  No prior EKG for comparison  Return if symptoms worsen or fail to improve  Future Appointments   Date Time Provider Jerome Butler   1/19/2022  8:20 AM Deepa Mercedes MD [facility]        Subjective:     Brody Hannon is a 55 y o  female who presents today for a follow-up on her acute medical conditions          HPI:  Chief Complaint   Patient presents with    Palpitations     when working out - lack of energy, feels it when being active - shortly after getting COVID shot     Medication Refill     none    Labs Only     no labs    HM     FLU      -- Above per clinical staff and reviewed  --    HPI      Today:      Palpitations - Symptoms since Monday, 12/13/21 when working working out  Had a racing heart that was atypical of her usual exercise - heart rate was fast and irregular  Unsure of heart rate  She rested for 1 minute, then resume activity s difficulty  On Wednesday, 12/15/21, heart rate was not as fast, but still felt fluttering  She could not squat and lift her weight ups as she could normally  She had to lower her weight and not squat due to lack of energy  Her family became ill c COVID x 2 weeks, so patient rested  She returned to exercise on Monday, 1/10/22, with irregular heartbeat and substernal chest pain - dull, achy pain, improved after 1-2 minutes of rest  Returned to exercise at a lower intensity and did ok  Yesterday, 1/12/22 had similar symptoms c exercise, but now her left anterior chest and inferior to left breast is achy  Not using OTC meds  She perform her ADLs s difficulty  S/p Positive COVID test 12/28/21  S/p Covid Booster 12/9/21  The following portions of the patient's history were reviewed and updated as appropriate: allergies, current medications, past family history, past medical history, past social history, past surgical history and problem list       Review of Systems   Constitutional: Positive for fatigue (With some exercises)  Negative for appetite change, chills, diaphoresis and fever  Respiratory: Negative for cough, chest tightness, shortness of breath and wheezing  Cardiovascular: Positive for chest pain and palpitations  Negative for leg swelling  Gastrointestinal: Negative for abdominal pain, blood in stool, diarrhea, nausea and vomiting  Genitourinary: Negative for dysuria  Current Outpatient Medications   Medication Sig Dispense Refill    lisinopril (ZESTRIL) 10 mg tablet Take 1 tablet (10 mg total) by mouth daily 90 tablet 1    multivitamin (THERAGRAN) TABS Take 1 tablet by mouth daily      Omega-3 Fatty Acids (FISH OIL OMEGA-3) 1000 MG CAPS Take 2 g by mouth daily      Probiotic Product (PROBIOTIC-10) CAPS Take 1 capsule by mouth daily       valACYclovir (VALTREX) 1,000 mg tablet Two tabs po at onset of cold sore, repeat two tablets 12 hours later   (total 4 tabs per outbreak) (Patient not taking: Reported on 1/13/2022 ) 12 tablet 1     No current facility-administered medications for this visit  Objective:  /88   Pulse 98   Temp 97 9 °F (36 6 °C)   Resp 16   Ht 5' 4 75" (1 645 m)   Wt 94 4 kg (208 lb 3 2 oz)   SpO2 100%   BMI 34 91 kg/m²    Wt Readings from Last 3 Encounters:   01/13/22 94 3 kg (208 lb)   01/13/22 94 4 kg (208 lb 3 2 oz)   12/28/21 90 7 kg (200 lb)      BP Readings from Last 3 Encounters:   01/13/22 142/88   01/13/22 142/88   09/17/21 120/72          Physical Exam  Vitals and nursing note reviewed  Constitutional:       Appearance: Normal appearance  She is well-developed  She is obese  HENT:      Head: Normocephalic and atraumatic  Eyes:      Conjunctiva/sclera: Conjunctivae normal    Neck:      Thyroid: No thyromegaly  Cardiovascular:      Rate and Rhythm: Normal rate and regular rhythm  Heart sounds: Normal heart sounds  Pulmonary:      Effort: Pulmonary effort is normal       Breath sounds: Normal breath sounds  Chest:      Chest wall: Tenderness (Substernal - not reproducible) present  Abdominal:      General: Bowel sounds are normal  There is no distension  Palpations: Abdomen is soft  There is no mass  Tenderness: There is no abdominal tenderness  There is no guarding or rebound  Musculoskeletal:         General: No swelling  Cervical back: Neck supple  Right lower leg: No edema        Left lower leg: No edema  Neurological:      General: No focal deficit present  Mental Status: She is alert and oriented to person, place, and time  Psychiatric:         Mood and Affect: Mood normal          Lab Results:      Lab Results   Component Value Date    WBC 5 36 01/13/2022    HGB 13 5 01/13/2022    HCT 41 6 01/13/2022     01/13/2022    TRIG 242 (H) 01/13/2022    HDL 35 (L) 01/13/2022    ALT 29 01/13/2022    AST 20 01/13/2022    K 3 7 01/13/2022     (H) 01/13/2022    CREATININE 0 87 01/13/2022    BUN 15 01/13/2022    CO2 27 01/13/2022    GLUF 108 (H) 01/13/2022    HGBA1C 5 9 (H) 01/13/2022     No results found for: URICACID  Invalid input(s): BASENAME Vitamin D    No results found  POCT Labs      BMI Counseling: Body mass index is 34 91 kg/m²  The BMI is above normal  Nutrition recommendations include encouraging healthy choices of fruits and vegetables  Exercise recommendations include exercising 3-5 times per week  No pharmacotherapy was ordered  Rationale for BMI follow-up plan is due to patient being overweight or obese

## 2022-01-14 ENCOUNTER — PATIENT MESSAGE (OUTPATIENT)
Dept: FAMILY MEDICINE CLINIC | Facility: CLINIC | Age: 47
End: 2022-01-14

## 2022-01-14 DIAGNOSIS — F41.9 ANXIETY: Primary | ICD-10-CM

## 2022-01-14 LAB
LDH SERPL-CCNC: 145 IU/L (ref 119–226)
LDH1 CFR SERPL ELPH: 20 % (ref 17–32)
LDH2 CFR SERPL ELPH: 37 % (ref 25–40)
LDH3 CFR SERPL ELPH: 24 % (ref 17–27)
LDH4 CFR SERPL ELPH: 10 % (ref 5–13)
LDH5 CFR SERPL ELPH: 9 % (ref 4–20)

## 2022-01-14 RX ORDER — ESCITALOPRAM OXALATE 10 MG/1
10 TABLET ORAL DAILY
Qty: 30 TABLET | Refills: 5 | Status: SHIPPED | OUTPATIENT
Start: 2022-01-14

## 2022-01-16 NOTE — RESULT ENCOUNTER NOTE
Stable lactate dehydrogenase isoenzymes  Continue plan of care        Message sent to patient via [employer] patient portal

## 2022-01-19 ENCOUNTER — OFFICE VISIT (OUTPATIENT)
Dept: CARDIOLOGY CLINIC | Facility: CLINIC | Age: 47
End: 2022-01-19
Payer: COMMERCIAL

## 2022-01-19 VITALS
SYSTOLIC BLOOD PRESSURE: 135 MMHG | OXYGEN SATURATION: 96 % | BODY MASS INDEX: 33.97 KG/M2 | HEIGHT: 64 IN | HEART RATE: 88 BPM | WEIGHT: 199 LBS | DIASTOLIC BLOOD PRESSURE: 85 MMHG

## 2022-01-19 DIAGNOSIS — I10 ESSENTIAL HYPERTENSION: ICD-10-CM

## 2022-01-19 DIAGNOSIS — R00.2 PALPITATIONS: Primary | ICD-10-CM

## 2022-01-19 PROBLEM — E78.5 DYSLIPIDEMIA: Status: ACTIVE | Noted: 2022-01-19

## 2022-01-19 PROCEDURE — 1036F TOBACCO NON-USER: CPT | Performed by: INTERNAL MEDICINE

## 2022-01-19 PROCEDURE — 99203 OFFICE O/P NEW LOW 30 MIN: CPT | Performed by: INTERNAL MEDICINE

## 2022-01-19 PROCEDURE — 3008F BODY MASS INDEX DOCD: CPT | Performed by: INTERNAL MEDICINE

## 2022-01-19 PROCEDURE — 93000 ELECTROCARDIOGRAM COMPLETE: CPT | Performed by: INTERNAL MEDICINE

## 2022-01-19 PROCEDURE — 3079F DIAST BP 80-89 MM HG: CPT | Performed by: INTERNAL MEDICINE

## 2022-01-19 PROCEDURE — 3075F SYST BP GE 130 - 139MM HG: CPT | Performed by: INTERNAL MEDICINE

## 2022-01-19 NOTE — PROGRESS NOTES
[facility]    CHIEF COMPLAINT:   Chief Complaint   Patient presents with    New Patient Visit    Palpitations     after booster shot especially when she works out       HPI:  Melissa Patterson is a 55 y o  female with a past medical history of anxiety, thyroid nodules, Factor 5 Leiden heterozygous, hypertension, who presents for palpitations  Briefly, she received her COVID-19 vaccine booster on 12/9/21 and a few days later while working out in the gym, she noticed a rapid heart rate during her workout  Work out typically includes [employer], [employer], [employer], etc  In the middle of her workout she felt her heart "race" which she has not experienced before  She though this may have been due to not sleeping much the night before  The following day, she worked out again and overall felt that she had less energy  She didn't work out for the remainder of the week  About a week later she developed head cold symptoms including congestion in the ears and sore throat  Also had vertigo  She tested positive for SARS-CoV-2 on 12/28/21  Cold symptoms have since resolved  Started working out again last week and felt her heart was "beating weird " Also complained of a dull pain over a pinpoint area along the left sternum  This was occurring while lifting weights and resolved after putting the weights away  Palpitations only occur when she is lifting weights, the do not occur at rest      Drinks about 2 cups coffee and noticed that it caused her heart to beat faster  Started on Lexapro two days ago - currently on 5 mg  Doesn't check bp at home  Thyroid ultrasound in 7/2020 with benign findings  Recent TSH 1 17  Hx of preeclampsia with her first born child but went almost full term  No preeclampsia during her second pregnancy but she did have elevated bp  Smoked very little while in college  Does not drink alcohol  Father had open heart surgery in late 63's to early 66's      The following portions of the patient's history were reviewed and updated as appropriate: allergies, current medications, past family history, past medical history, past social history, past surgical history, and problem list     SINCE LAST OV I REVIEWED WITH THE PATIENT THE INTERIM LABS, TEST RESULTS, CONSULTANT(S) NOTES AND PERFORMED AN INTERIM REVIEW OF HISTORY    Past Medical History:   Diagnosis Date    Allergic     [place], soy, seasonal, Peanuts hurt my stomach     Allergic rhinitis     Anxiety 2004    COVID-19 2021    Depression 2018    Factor 5 Leiden mutation, heterozygous ([address] )     Generalized headaches     More frequent around my period    Headache(784 0) 2018    Hypertension 2004    Low back pain 2017    Mitral regurgitation 2022    Trace    Tricuspid regurgitation 2022    Trace    Varicella        Past Surgical History:   Procedure Laterality Date    VAGINAL DELIVERY      WISDOM TOOTH EXTRACTION         Social History     Socioeconomic History    Marital status: /Civil Union     Spouse name: Liliane De Jesus Number of children: 2    Years of education: Not on file    Highest education level: Not on file   Occupational History    Not on file   Tobacco Use    Smoking status: Former Smoker     Packs/day: 0 25     Years: 4 00     Pack years: 1 00     Types: Cigarettes     Quit date: 1997     Years since quittin 0    Smokeless tobacco: Never Used   Vaping Use    Vaping Use: Never used   Substance and Sexual Activity    Alcohol use: Not Currently     Alcohol/week: 0 0 standard drinks    Drug use: Never    Sexual activity: Yes     Partners: Male     Birth control/protection: Male Sterilization, None   Other Topics Concern    Not on file   Social History Narrative    Not on file     Social Determinants of Health     Financial Resource Strain: Not on file   Food Insecurity: Not on file   Transportation Needs: Not on file   Physical Activity: Not on file   Stress: Not on file Social Connections: Not on file   Intimate Partner Violence: Not on file   Housing Stability: Not on file       Family History   Problem Relation Age of Onset    Coronary artery disease Father     Diabetes Father     Hypothyroidism Mother     Breast cancer Mother 61    Thyroid disease Mother     Clotting disorder Sister     Thrombosis Sister     ADD / ADHD Son     Asthma Son     No Known Problems Daughter     No Known Problems Maternal Grandmother     No Known Problems Maternal Grandfather     No Known Problems Paternal Grandmother     No Known Problems Paternal Grandfather     No Known Problems Sister     Throat cancer Maternal Aunt         age unknown    Melanoma Maternal Aunt 39    No Known Problems Maternal Aunt        Allergies   Allergen Reactions    Natural Vegetable Orange [Psyllium] Anaphylaxis    Other Hives and Other (See Comments)     Almonds - causes oral pain   Oranges - Hives    [place] Oil - Food Allergy Other (See Comments)    Latex        Current Outpatient Medications   Medication Sig Dispense Refill    escitalopram (Lexapro) 10 mg tablet Take 1 tablet (10 mg total) by mouth daily 30 tablet 5    lisinopril (ZESTRIL) 10 mg tablet Take 1 tablet (10 mg total) by mouth daily 90 tablet 1    multivitamin (THERAGRAN) TABS Take 1 tablet by mouth daily      Omega-3 Fatty Acids (FISH OIL OMEGA-3) 1000 MG CAPS Take 2 g by mouth daily      Probiotic Product (PROBIOTIC-10) CAPS Take 1 capsule by mouth daily       valACYclovir (VALTREX) 1,000 mg tablet Two tabs po at onset of cold sore, repeat two tablets 12 hours later   (total 4 tabs per outbreak) 12 tablet 1     No current facility-administered medications for this visit  /85 (BP Location: Left arm, Patient Position: Sitting, Cuff Size: Standard)   Pulse 88   Ht 5' 4" (1 626 m)   Wt 90 3 kg (199 lb)   SpO2 96%   BMI 34 16 kg/m²     Review of Systems   All other systems reviewed and are negative        Physical Exam  Vitals reviewed  Constitutional:       General: She is not in acute distress  Appearance: Normal appearance  She is not ill-appearing or toxic-appearing  HENT:      Head: Normocephalic and atraumatic  Eyes:      General: No scleral icterus  Extraocular Movements: Extraocular movements intact  Neck:      Vascular: No carotid bruit  Cardiovascular:      Rate and Rhythm: Normal rate and regular rhythm  Pulses: Normal pulses  Heart sounds: Normal heart sounds  No murmur heard  No friction rub  No gallop  Pulmonary:      Effort: Pulmonary effort is normal  No respiratory distress  Breath sounds: Normal breath sounds  No wheezing or rales  Abdominal:      General: Abdomen is flat  Bowel sounds are normal  There is no distension  Palpations: Abdomen is soft  Tenderness: There is no abdominal tenderness  Musculoskeletal:      Right lower leg: No edema  Left lower leg: No edema  Skin:     General: Skin is warm and dry  Coloration: Skin is not jaundiced or pale  Neurological:      Mental Status: She is alert     Psychiatric:         Mood and Affect: Mood normal          Behavior: Behavior normal           Lab Results   Component Value Date    K 3 7 01/13/2022     (H) 01/13/2022    CO2 27 01/13/2022    BUN 15 01/13/2022    CREATININE 0 87 01/13/2022    CALCIUM 8 8 01/13/2022    ALT 29 01/13/2022    AST 20 01/13/2022       Lab Results   Component Value Date    HDL 35 (L) 01/13/2022    LDLCALC 122 (H) 01/13/2022    TRIG 242 (H) 01/13/2022       Lab Results   Component Value Date    WBC 5 36 01/13/2022    HGB 13 5 01/13/2022    HCT 41 6 01/13/2022     01/13/2022       Lab Results   Component Value Date     01/13/2022    HGBA1C 5 9 (H) 01/13/2022     Cardiac studies:  Results for orders placed or performed in visit on 01/19/22   POCT ECG    Impression    Sinus rhythm  Nonspecific ST abnormality          TTE - 1/13/2022:  Findings  Left Ventricle Left ventricular cavity size is normal  Wall thickness is normal  The left ventricular ejection fraction is 65%  Systolic function is normal   Wall motion is normal  Diastolic function is normal    Right Ventricle Right ventricular cavity size is normal  Systolic function is normal  Wall thickness is normal    Left Atrium The atrium is mildly dilated  Right Atrium The atrium is normal in size  Aortic Valve The aortic valve is trileaflet  The leaflets are not thickened  The leaflets are not calcified  The leaflets exhibit normal mobility  There is no evidence of regurgitation  There is no evidence of stenosis  Mitral Valve The mitral valve has normal structure and function  There is trace regurgitation  There is no evidence of stenosis  Tricuspid Valve Tricuspid valve structure is normal  There is trace regurgitation  There is no evidence of stenosis  Pulmonic Valve Pulmonic valve structure is normal  There is no evidence of regurgitation  There is no evidence of stenosis  Ascending Aorta The aortic root is normal in size  IVC/SVC The inferior vena cava is normal in size  Pericardium There is no pericardial effusion  The pericardium is normal in appearance  Pulmonary Veins The pulmonary veins have normal venous flow  Flow pattern is normal        ASSESSMENT AND PLAN:  Karan Cunningham was seen today for new patient visit and palpitations  Diagnoses and all orders for this visit:    #  Palpitations: Reports new palpitations which appear to have begun after she received her COVID-19 booster although within a very short period she had also contracted COVID-19 from a family member  Symptoms occur with exertion primarily and do not occur at rest  TTE reviewed and no significant findings  We will check a 24 Hour Holter monitor and I have instructed her to resume her normal daily activities while wearing this  If unremarkable and her symptoms persist, will consider longer monitor or EST    -     Ambulatory Referral to Cardiology  -     POCT ECG  -     Holter monitor; Future    #  Essential hypertension: Blood pressure mildly elevated and similar on recheck  Monitor bp and increase Lisinopril if it remains elevated  Hx preeclampsia  Lipid panel reviewed - she reports this was not fasting  In any case, low 10-year ASCVD risk   Recommend diet, exercise, weight loss for improvement on blood pressure and lipids   -     POCT ECG      Lilly Marquez MD

## 2022-01-19 NOTE — PATIENT INSTRUCTIONS
You were seen today in the Cardiology office for palpitations  I have ordered a Holter monitor and will call you once I have the results  Thank you for choosing [facility]  Please call our office or use [employer] with any questions

## 2022-02-09 ENCOUNTER — HOSPITAL ENCOUNTER (OUTPATIENT)
Dept: NON INVASIVE DIAGNOSTICS | Facility: HOSPITAL | Age: 47
Discharge: HOME | End: 2022-02-09
Attending: INTERNAL MEDICINE
Payer: COMMERCIAL

## 2022-02-09 DIAGNOSIS — R00.2 PALPITATIONS: ICD-10-CM

## 2022-02-09 PROCEDURE — 93226 XTRNL ECG REC<48 HR SCAN A/R: CPT

## 2022-02-09 PROCEDURE — 93225 XTRNL ECG REC<48 HRS REC: CPT

## 2022-02-20 PROCEDURE — 93227 XTRNL ECG REC<48 HR R&I: CPT | Performed by: INTERNAL MEDICINE

## 2022-03-02 ENCOUNTER — TELEPHONE (OUTPATIENT)
Dept: CARDIOLOGY CLINIC | Facility: CLINIC | Age: 47
End: 2022-03-02

## 2022-03-02 NOTE — TELEPHONE ENCOUNTER
----- Message from Wesly Oliveira MD sent at 3/1/2022  6:35 PM EST -----  Attempted to reach at the number provided but no answer  Overall unremarkable Holter monitor with no significant amount of extra beats or arrhythmias noted

## 2022-03-02 NOTE — TELEPHONE ENCOUNTER
Called pt, left message that holter monitor results were normal  Left  Call back number in case pt has any questions

## 2022-03-31 ENCOUNTER — TELEPHONE (OUTPATIENT)
Dept: CARDIOLOGY CLINIC | Facility: CLINIC | Age: 47
End: 2022-03-31

## 2022-04-28 ENCOUNTER — OFFICE VISIT (OUTPATIENT)
Dept: CARDIOLOGY CLINIC | Facility: CLINIC | Age: 47
End: 2022-04-28
Payer: COMMERCIAL

## 2022-04-28 VITALS
HEART RATE: 77 BPM | BODY MASS INDEX: 33.97 KG/M2 | WEIGHT: 199 LBS | OXYGEN SATURATION: 99 % | DIASTOLIC BLOOD PRESSURE: 65 MMHG | SYSTOLIC BLOOD PRESSURE: 112 MMHG | HEIGHT: 64 IN

## 2022-04-28 DIAGNOSIS — R00.2 PALPITATIONS: ICD-10-CM

## 2022-04-28 DIAGNOSIS — F41.9 ANXIETY: ICD-10-CM

## 2022-04-28 DIAGNOSIS — I10 ESSENTIAL HYPERTENSION: Primary | ICD-10-CM

## 2022-04-28 PROCEDURE — 99211 OFF/OP EST MAY X REQ PHY/QHP: CPT | Performed by: INTERNAL MEDICINE

## 2022-04-28 PROCEDURE — 3078F DIAST BP <80 MM HG: CPT | Performed by: INTERNAL MEDICINE

## 2022-04-28 PROCEDURE — 3074F SYST BP LT 130 MM HG: CPT | Performed by: INTERNAL MEDICINE

## 2022-04-28 PROCEDURE — 1036F TOBACCO NON-USER: CPT | Performed by: INTERNAL MEDICINE

## 2022-04-28 PROCEDURE — 3008F BODY MASS INDEX DOCD: CPT | Performed by: INTERNAL MEDICINE

## 2022-04-28 NOTE — PROGRESS NOTES
[facility]    CHIEF COMPLAINT:   Chief Complaint   Patient presents with    Follow-up    Palpitations     thinks may be due to starting flonase again        HPI:  Jaison Smith is a 55 y o  female with a past medical history of anxiety, thyroid nodules, Factor 5 Leiden heterozygous, hypertension  She presents today for follow-up for palpitations  Briefly, she received her COVID-19 vaccine booster on 12/9/21 and a few days later while working out in the gym, she noticed a rapid heart rate during her workout  Work out typically includes [employer], [employer], [employer], etc  In the middle of her workout she felt her heart "race" which she has not experienced before  She though this may have been due to not sleeping much the night before  The following day, she worked out again and overall felt that she had less energy  She didn't work out for the remainder of the week  About a week later she developed head cold symptoms including congestion in the ears and sore throat  Also had vertigo  She tested positive for SARS-CoV-2 on 12/28/21  After resuming her workouts she felt her heart was "beating weird " Also complained of a dull pain over a pinpoint area along the left sternum  This was occurring while lifting weights and resolved after putting the weights away  Palpitations only occur when she is lifting weights, the do not occur at rest      Drinks about 2 cups coffee and noticed that it caused her heart to beat faster  Started on Lexapro  Doesn't check bp at home  Thyroid ultrasound in 7/2020 with benign findings  Recent TSH 1 17  Hx of preeclampsia with her first born child but went almost full term  No preeclampsia during her second pregnancy but she did have elevated bp  Smoked very little while in college  Does not drink alcohol  Father had open heart surgery in late 63's to early 66's  Interval history:  She feels well since our last office visit and after being started on Lexapro  She has started working out again increased check about 3 times per week without any physical limitations  Her palpitations have not recurred for quite some time until recently she has been taking her medications for several days and used Flonase which resulted in some palpitations  She otherwise feels well denies any fever, chills, abdominal pain, diarrhea, chest pain, shortness of breath, orthopnea, lower extremity swelling      The following portions of the patient's history were reviewed and updated as appropriate: allergies, current medications, past family history, past medical history, past social history, past surgical history, and problem list     SINCE LAST OV I REVIEWED WITH THE PATIENT THE INTERIM LABS, TEST RESULTS, CONSULTANT(S) NOTES AND PERFORMED AN INTERIM REVIEW OF HISTORY    Past Medical History:   Diagnosis Date    Allergic     [place], soy, seasonal, Peanuts hurt my stomach     Allergic rhinitis     Anxiety     COVID-19 2021    Depression 2018    Factor 5 Leiden mutation, heterozygous ([facility] [address]     Generalized headaches     More frequent around my period    Headache(784 0) 2018    Hypertension 2004    Low back pain 2017    Mitral regurgitation 2022    Trace    Tricuspid regurgitation 2022    Trace    Varicella        Past Surgical History:   Procedure Laterality Date    VAGINAL DELIVERY      WISDOM TOOTH EXTRACTION         Social History     Socioeconomic History    Marital status: /Civil Union     Spouse name: Darin Walters Number of children: 2    Years of education: Not on file    Highest education level: Not on file   Occupational History    Not on file   Tobacco Use    Smoking status: Former Smoker     Packs/day: 0 25     Years: 4 00     Pack years: 1 00     Types: Cigarettes     Quit date: 1997     Years since quittin 3    Smokeless tobacco: Never Used   Vaping Use    Vaping Use: Never used   Substance and Sexual Activity    Alcohol use: Not Currently     Alcohol/week: 0 0 standard drinks    Drug use: Never    Sexual activity: Yes     Partners: Male     Birth control/protection: Male Sterilization, None   Other Topics Concern    Not on file   Social History Narrative    Not on file     Social Determinants of Health     Financial Resource Strain: Not on file   Food Insecurity: Not on file   Transportation Needs: Not on file   Physical Activity: Not on file   Stress: Not on file   Social Connections: Not on file   Intimate Partner Violence: Not on file   Housing Stability: Not on file       Family History   Problem Relation Age of Onset    Coronary artery disease Father     Diabetes Father     Hypothyroidism Mother     Breast cancer Mother 61    Thyroid disease Mother     Clotting disorder Sister     Thrombosis Sister     ADD / ADHD Son     Asthma Son     No Known Problems Daughter     No Known Problems Maternal Grandmother     No Known Problems Maternal Grandfather     No Known Problems Paternal Grandmother     No Known Problems Paternal Grandfather     No Known Problems Sister     Throat cancer Maternal Aunt         age unknown    Melanoma Maternal Aunt 39    No Known Problems Maternal Aunt        Allergies   Allergen Reactions    Natural Vegetable Orange [Psyllium] Anaphylaxis    Other Hives and Other (See Comments)     Almonds - causes oral pain   Oranges - Hives    [place] Oil - Food Allergy Other (See Comments)    Latex        Current Outpatient Medications   Medication Sig Dispense Refill    escitalopram (Lexapro) 10 mg tablet Take 1 tablet (10 mg total) by mouth daily (Patient taking differently: Take 5 mg by mouth daily  ) 30 tablet 5    lisinopril (ZESTRIL) 10 mg tablet Take 1 tablet (10 mg total) by mouth daily 90 tablet 1    multivitamin (THERAGRAN) TABS Take 1 tablet by mouth daily      Omega-3 Fatty Acids (FISH OIL OMEGA-3) 1000 MG CAPS Take 2 g by mouth daily      Probiotic Product (PROBIOTIC-10) CAPS Take 1 capsule by mouth daily       valACYclovir (VALTREX) 1,000 mg tablet Two tabs po at onset of cold sore, repeat two tablets 12 hours later   (total 4 tabs per outbreak) 12 tablet 1     No current facility-administered medications for this visit  /65 (BP Location: Left arm, Patient Position: Sitting, Cuff Size: Standard)   Pulse 77   Ht 5' 4" (1 626 m)   Wt 90 3 kg (199 lb)   SpO2 99%   BMI 34 16 kg/m²     Review of Systems   All other systems reviewed and are negative  Physical Exam  Vitals reviewed  Constitutional:       General: She is not in acute distress  Appearance: Normal appearance  HENT:      Head: Normocephalic and atraumatic  Eyes:      Extraocular Movements: Extraocular movements intact  Neck:      Vascular: No carotid bruit  Cardiovascular:      Rate and Rhythm: Normal rate and regular rhythm  Pulses: Normal pulses  Heart sounds: Normal heart sounds  No murmur heard  No gallop  Pulmonary:      Effort: Pulmonary effort is normal  No respiratory distress  Breath sounds: Normal breath sounds  No wheezing or rales  Abdominal:      General: Abdomen is flat  Bowel sounds are normal  There is no distension  Palpations: Abdomen is soft  Tenderness: There is no abdominal tenderness  Musculoskeletal:      Right lower leg: No edema  Left lower leg: No edema  Skin:     General: Skin is warm and dry  Neurological:      Mental Status: She is alert     Psychiatric:         Mood and Affect: Mood normal          Behavior: Behavior normal           Lab Results   Component Value Date    K 3 7 01/13/2022     (H) 01/13/2022    CO2 27 01/13/2022    BUN 15 01/13/2022    CREATININE 0 87 01/13/2022    CALCIUM 8 8 01/13/2022    ALT 29 01/13/2022    AST 20 01/13/2022       Lab Results   Component Value Date    HDL 35 (L) 01/13/2022    LDLCALC 122 (H) 01/13/2022    TRIG 242 (H) 01/13/2022       Lab Results   Component Value Date    WBC 5 36 01/13/2022    HGB 13 5 01/13/2022    HCT 41 6 01/13/2022     01/13/2022       Lab Results   Component Value Date     01/13/2022    HGBA1C 5 9 (H) 01/13/2022     Cardiac studies:  24 Hour Holter Monitor - 2/9/22:  Average heart rate:  78 beats/minute  Lowest heart rate:  58 beats/minute  Highest  heart rate:  160 beats/minute   Longest RR interval:  1 1 sec      PREDOMINANT RHYTHM:  Normal sinus   ATRIOVENTRICULAR AND INTERVENTRICULAR CONDUCTION:  Normal   The recording showed no occurrence of atrial fibrillation/flutter,  prolonged pauses or significant AV block      BRADYCARDIA:   Total duration:  20 minute 48 seconds  Slowest episode:  occurred at 10:31 p m ,  and lasted 1 minute 32 seconds (minimum heart rate of 58 bpm)   This corresponded with sinus bradycardia with no evidence of heart block      TACHYCARDIA:  Total duration:  1 hour 14 minutes  Fastest episode: occurred at 5:16 a m ,  and lasted 1 minute 11 seconds (maximum heart rate of 160 bpm)   This corresponded with sinus tachycardia (patient reported that she was working out at that time)        SUPRAVENTRICULAR ECTOPY accounted for 0 0 % of heart beats recorded  Total number: 1  Supraventricular Couplets: 0   Bigeminy: 0   Trigeminy: 0   Runs: 0     VENTRICULAR ECTOPY accounted for 0 0 % of the recorded heart beats  Total number: 27  Isolated premature ventricular contractions: 27  Interpolated premature ventricular contractions: 0  Ventricular Couplets: 0   Bigeminy: 0   Trigeminy: 0   Runs: 0    PATIENT DIARY/SYMPTOMS:  The patient diary was returned with several activities but no symptoms reported        IMPRESSION:     1) Overall normal Holter monitor findings  Predominant rhythm was normal sinus with physiologic variation in heart rate  2) There was trivial (normal), supraventricular and ventricular ectopy as described     3) Patient reported that she experienced no symptoms during the monitor period  TTE - 1/13/2022:  Findings  Left Ventricle Left ventricular cavity size is normal  Wall thickness is normal  The left ventricular ejection fraction is 65%  Systolic function is normal   Wall motion is normal  Diastolic function is normal    Right Ventricle Right ventricular cavity size is normal  Systolic function is normal  Wall thickness is normal    Left Atrium The atrium is mildly dilated  Right Atrium The atrium is normal in size  Aortic Valve The aortic valve is trileaflet  The leaflets are not thickened  The leaflets are not calcified  The leaflets exhibit normal mobility  There is no evidence of regurgitation  There is no evidence of stenosis  Mitral Valve The mitral valve has normal structure and function  There is trace regurgitation  There is no evidence of stenosis  Tricuspid Valve Tricuspid valve structure is normal  There is trace regurgitation  There is no evidence of stenosis  Pulmonic Valve Pulmonic valve structure is normal  There is no evidence of regurgitation  There is no evidence of stenosis  Ascending Aorta The aortic root is normal in size  IVC/SVC The inferior vena cava is normal in size  Pericardium There is no pericardial effusion  The pericardium is normal in appearance  Pulmonary Veins The pulmonary veins have normal venous flow  Flow pattern is normal        ASSESSMENT AND PLAN:  Martita Varela was seen today for follow-up and palpitations  Diagnoses and all orders for this visit:    Essential hypertension:  She has a history of preeclampsia  Blood pressure is currently well controlled  Continue lisinopril 10 mg daily  Continue lifestyle modification with diet, exercise, weight loss  Anxiety  Palpitations:  Palpitations which started after receiving COVID-19 booster although she had contracted COVID-19 from a family member within a short period of time  TTE was unremarkable except for mildly dilated LA    Holter monitor revealed sinus rhythm and minimal/rare supraventricular ectopy  Symptoms have improved after starting treatment for anxiety  She is otherwise asymptomatic from a cardiovascular perspective and tolerating physical activity without limitations      Mu Villaseñor MD

## 2022-04-28 NOTE — PATIENT INSTRUCTIONS
You were seen today in the Cardiology office for follow up  I am glad to hear you are feeling better  Your blood pressure is well controlled  Please continue to stay active  Please continue your current cardiac medications as prescribed  Thank you for choosing [address]  Please call our office or use [employer] with any questions

## 2022-05-02 DIAGNOSIS — I10 ESSENTIAL HYPERTENSION: ICD-10-CM

## 2022-05-02 RX ORDER — LISINOPRIL 10 MG/1
TABLET ORAL
Qty: 90 TABLET | Refills: 1 | OUTPATIENT
Start: 2022-05-02

## 2022-05-31 DIAGNOSIS — I10 ESSENTIAL HYPERTENSION: ICD-10-CM

## 2022-05-31 RX ORDER — LISINOPRIL 10 MG/1
10 TABLET ORAL DAILY
Qty: 90 TABLET | Refills: 0 | Status: SHIPPED | OUTPATIENT
Start: 2022-05-31

## 2022-08-04 ENCOUNTER — TELEPHONE (OUTPATIENT)
Dept: FAMILY MEDICINE CLINIC | Facility: CLINIC | Age: 47
End: 2022-08-04

## 2022-08-04 NOTE — TELEPHONE ENCOUNTER
Susan from [facility] needs to have the new order signed and faxed over for the new plan of care or send a new script over for the physical therapy    The fax number is [phone]

## 2022-08-26 DIAGNOSIS — I10 ESSENTIAL HYPERTENSION: ICD-10-CM

## 2022-08-26 RX ORDER — LISINOPRIL 10 MG/1
TABLET ORAL
Qty: 90 TABLET | Refills: 1 | Status: SHIPPED | OUTPATIENT
Start: 2022-08-26 | End: 2022-09-07 | Stop reason: SDUPTHER

## 2022-08-26 NOTE — TELEPHONE ENCOUNTER
Medication refill requested: lisinopril  Last office visit: 1/13/22, ill   Next office visit: none - due for PE 9/22  Last refilled: 5/31/22 #90x0  Ordering Provider: Jerome Weldon (select pharmacy send RX to):   [address] [place] [address] [id number] Magdiel Pagan [facility] [id number]  Phone: [phone] Fax: [phone]    [id number], [address] [place] [id number]  Phone: [phone] Fax: [phone]

## 2022-09-07 ENCOUNTER — OFFICE VISIT (OUTPATIENT)
Dept: FAMILY MEDICINE CLINIC | Facility: CLINIC | Age: 47
End: 2022-09-07
Payer: COMMERCIAL

## 2022-09-07 VITALS
OXYGEN SATURATION: 99 % | TEMPERATURE: 97.4 F | HEIGHT: 64 IN | DIASTOLIC BLOOD PRESSURE: 88 MMHG | WEIGHT: 206 LBS | HEART RATE: 86 BPM | SYSTOLIC BLOOD PRESSURE: 132 MMHG | BODY MASS INDEX: 35.17 KG/M2

## 2022-09-07 DIAGNOSIS — Z00.00 PHYSICAL EXAM, ANNUAL: Primary | ICD-10-CM

## 2022-09-07 DIAGNOSIS — F41.9 ANXIETY: ICD-10-CM

## 2022-09-07 DIAGNOSIS — R32 URINARY INCONTINENCE, UNSPECIFIED TYPE: ICD-10-CM

## 2022-09-07 DIAGNOSIS — I10 ESSENTIAL HYPERTENSION: ICD-10-CM

## 2022-09-07 DIAGNOSIS — E78.2 MIXED HYPERLIPIDEMIA: ICD-10-CM

## 2022-09-07 DIAGNOSIS — G43.009 MIGRAINE WITHOUT AURA AND WITHOUT STATUS MIGRAINOSUS, NOT INTRACTABLE: ICD-10-CM

## 2022-09-07 DIAGNOSIS — Z12.31 OTHER SCREENING MAMMOGRAM: ICD-10-CM

## 2022-09-07 PROBLEM — E04.2 MULTIPLE THYROID NODULES: Status: RESOLVED | Noted: 2019-03-04 | Resolved: 2022-09-07

## 2022-09-07 LAB
SL AMB  POCT GLUCOSE, UA: NEGATIVE
SL AMB LEUKOCYTE ESTERASE,UA: NEGATIVE
SL AMB POCT BILIRUBIN,UA: NEGATIVE
SL AMB POCT BLOOD,UA: NEGATIVE
SL AMB POCT CLARITY,UA: CLEAR
SL AMB POCT COLOR,UA: YELLOW
SL AMB POCT KETONES,UA: NEGATIVE
SL AMB POCT NITRITE,UA: NEGATIVE
SL AMB POCT PH,UA: 6
SL AMB POCT SPECIFIC GRAVITY,UA: 1.01
SL AMB POCT URINE PROTEIN: NEGATIVE
SL AMB POCT UROBILINOGEN: 3.5

## 2022-09-07 PROCEDURE — 3075F SYST BP GE 130 - 139MM HG: CPT | Performed by: FAMILY MEDICINE

## 2022-09-07 PROCEDURE — 99214 OFFICE O/P EST MOD 30 MIN: CPT | Performed by: FAMILY MEDICINE

## 2022-09-07 PROCEDURE — 81003 URINALYSIS AUTO W/O SCOPE: CPT | Performed by: FAMILY MEDICINE

## 2022-09-07 PROCEDURE — 99396 PREV VISIT EST AGE 40-64: CPT | Performed by: FAMILY MEDICINE

## 2022-09-07 PROCEDURE — 3079F DIAST BP 80-89 MM HG: CPT | Performed by: FAMILY MEDICINE

## 2022-09-07 RX ORDER — LISINOPRIL 10 MG/1
10 TABLET ORAL DAILY
Qty: 90 TABLET | Refills: 3 | Status: SHIPPED | OUTPATIENT
Start: 2022-09-07

## 2022-09-07 RX ORDER — RIMEGEPANT SULFATE 75 MG/75MG
75 TABLET, ORALLY DISINTEGRATING ORAL DAILY PRN
Qty: 12 TABLET | Refills: 0 | Status: SHIPPED | OUTPATIENT
Start: 2022-09-07

## 2022-09-07 NOTE — PROGRESS NOTES
Assessment/Plan:    No problem-specific Assessment & Plan notes found for this encounter  Diagnoses and all orders for this visit:    Physical exam, annual  Comments:  check labs  continue working on healthy diet/exercise  obtain mammogram  call to schedule colonoscopy  Orders:  -     Hemoglobin A1C; Future  -     Comprehensive metabolic panel; Future  -     Lipid panel; Future    Other screening mammogram  -     Mammo screening bilateral w 3d & cad; Future    Essential hypertension  Comments:  well-controlled on current meds  continue same  labs as ordered  Orders:  -     lisinopril (ZESTRIL) 10 mg tablet; Take 1 tablet (10 mg total) by mouth daily    Urinary incontinence, unspecified type  Comments:  urine dip unremarkable  refer to urogyn  Orders:  -     POCT urine dip auto non-scope    Migraine without aura and without status migrainosus, not intractable  Comments:  failing ibuprofen/excedrin migraine, and triptans are contraindicated given her HTN  will give trial of nurtec  pt to contact me and lmk if med is helpful for acute headache   Orders:  -     Rimegepant Sulfate (Nurtec) 75 MG TBDP; Take 75 mg by mouth daily as needed (migraine) Max: 75mg/24 hours    Mixed hyperlipidemia  Comments:  labs as ordered    Anxiety  Comments:  doing well on lexapro 5mg  continue same        Subjective:      Patient ID: Chad Garcia is a 55 y o  female  HPI   Pt presents for physical exam and f/u  Doing well in general   Due for mammogram and colonoscopy  Soon due for labs  Exercising regularly and seeing dental   Up to date with gyn  Up to date with imm's  She is exercising regularly and working on weight loss  From a problem standpoint, she has several issues:    Has some incontinence with yoga and exercise  Not when laughing or sneezing  No frequency, urgency, dysuria  Has been occurring for 6+months  Getting headaches 1-2 x per month  Pain is sharp, one-sided and ongoing in nature    excedrin migraine not really helping  Notices chocolate can be a trigger  +nausea  No vomiting   +sound sensitivity  No light sensitivity  No paresthesias or weakness  Got these in her teen years and now they are coming back  Using lexapro for anxiety  Staying on 5mg which seems to be working well  No sadness, hopelessness  Anxiety controlled  The following portions of the patient's history were reviewed and updated as appropriate: allergies, current medications, past family history, past medical history, past social history, past surgical history and problem list     Review of Systems   Constitutional: Negative for chills, fatigue, fever and unexpected weight change  HENT: Negative for congestion, ear pain, hearing loss, postnasal drip, rhinorrhea, sinus pressure, sinus pain, sore throat, trouble swallowing and voice change  Eyes: Negative for pain, redness and visual disturbance  Respiratory: Negative for cough and shortness of breath  Cardiovascular: Negative for chest pain, palpitations and leg swelling  Gastrointestinal: Negative for abdominal pain, constipation, diarrhea and nausea  Endocrine: Negative for cold intolerance, heat intolerance, polydipsia and polyuria  Genitourinary: Negative for dysuria, frequency and urgency  Musculoskeletal: Positive for arthralgias  Negative for joint swelling and myalgias  Skin: Negative for rash  No suspicious lesions   Neurological: Positive for headaches  Negative for weakness and numbness  Hematological: Negative for adenopathy  Psychiatric/Behavioral: Negative for dysphoric mood and self-injury  Objective:      /88 (BP Location: Left arm, Patient Position: Sitting, Cuff Size: Standard)   Pulse 86   Temp (!) 97 4 °F (36 3 °C) (Temporal)   Ht 5' 4" (1 626 m)   Wt 93 4 kg (206 lb)   SpO2 99%   BMI 35 36 kg/m²          Physical Exam  Constitutional:       General: She is not in acute distress  Appearance: She is well-developed  HENT:      Head: Normocephalic and atraumatic  Right Ear: Tympanic membrane, ear canal and external ear normal       Left Ear: Tympanic membrane, ear canal and external ear normal       Nose: Nose normal       Mouth/Throat:      Pharynx: No oropharyngeal exudate  Eyes:      Conjunctiva/sclera: Conjunctivae normal       Pupils: Pupils are equal, round, and reactive to light  Neck:      Thyroid: No thyromegaly  Vascular: No carotid bruit or JVD  Cardiovascular:      Rate and Rhythm: Regular rhythm  Heart sounds: S1 normal and S2 normal  No murmur heard  No friction rub  No gallop  No S3 or S4 sounds  Pulmonary:      Effort: Pulmonary effort is normal       Breath sounds: Normal breath sounds  No wheezing, rhonchi or rales  Abdominal:      General: Bowel sounds are normal  There is no distension  Palpations: Abdomen is soft  Tenderness: There is no abdominal tenderness  Lymphadenopathy:      Cervical: No cervical adenopathy  Neurological:      Mental Status: She is alert and oriented to person, place, and time  Cranial Nerves: No cranial nerve deficit  Sensory: No sensory deficit  Deep Tendon Reflexes: Reflexes are normal and symmetric

## 2022-10-05 ENCOUNTER — CONSULT (OUTPATIENT)
Dept: GASTROENTEROLOGY | Facility: MEDICAL CENTER | Age: 47
End: 2022-10-05
Payer: COMMERCIAL

## 2022-10-05 VITALS
SYSTOLIC BLOOD PRESSURE: 126 MMHG | WEIGHT: 205.6 LBS | BODY MASS INDEX: 35.29 KG/M2 | DIASTOLIC BLOOD PRESSURE: 78 MMHG | TEMPERATURE: 96.8 F | HEART RATE: 83 BPM

## 2022-10-05 DIAGNOSIS — Z83.71 FAMILY HISTORY OF COLONIC POLYPS: Primary | ICD-10-CM

## 2022-10-05 DIAGNOSIS — K59.00 CONSTIPATION, UNSPECIFIED CONSTIPATION TYPE: ICD-10-CM

## 2022-10-05 PROCEDURE — 99203 OFFICE O/P NEW LOW 30 MIN: CPT | Performed by: STUDENT IN AN ORGANIZED HEALTH CARE EDUCATION/TRAINING PROGRAM

## 2022-10-05 RX ORDER — POLYETHYLENE GLYCOL 3350, SODIUM SULFATE ANHYDROUS, SODIUM BICARBONATE, SODIUM CHLORIDE, POTASSIUM CHLORIDE 236; 22.74; 6.74; 5.86; 2.97 G/4L; G/4L; G/4L; G/4L; G/4L
4000 POWDER, FOR SOLUTION ORAL ONCE
Qty: 4000 ML | Refills: 0 | Status: SHIPPED | OUTPATIENT
Start: 2022-10-05 | End: 2022-10-05

## 2022-10-05 NOTE — PROGRESS NOTES
[facility] - Outpatient Consultation  Billy Elizondo 55 y o  female MRN: [id number]  Encounter: [id number]          ASSESSMENT AND PLAN:    46F with HTN, HLD BMI 35 referred for colon cancer screening  Second degree relative with White but patient has had genetic testing that was negative  Does report sister with advanced adenoma and negative White testing so would screen at 5 year interval   Also reports issue with depression related to magnesium containing meds/supplements so will plan for prep without magnesium citrate  1  Family history of colonic polyps  - Colonoscopy; Future  - polyethylene glycol (Golytely) 4000 mL solution; Take 4,000 mL by mouth once for 1 dose Take 4000 mL by mouth once for 1 dose  Use as directed  Dispense: 4000 mL; Refill: 0    2  Constipation, unspecified constipation type  - Counseled on using miralax as needed for constipation if fiber supplement not working/causing bloating     ______________________________________________________________________    HPI:    Sometimes gets constipated  Uses probiotics and fiber  Has a BM most days  Notices some change with menstrual cycle  Sometimes uses fiber gummies  Gets bloating related to food allergies  Sometimes gets bloated from fiber  Has hemorrhoids, sometimes sees bright red blood with wiping  Had some depression that has been related to using magnesium  Maternal uncle with colon cancer at age 36  He had White symdrome but she had genetic testing that was negative  Sisters have had advanced adenomas  No prior colonoscopy  REVIEW OF SYSTEMS:    CONSTITUTIONAL: Denies any fever, chills, rigors, and weight loss  HEENT: No earache or tinnitus  Denies hearing loss or visual disturbances  CARDIOVASCULAR: No chest pain or palpitations  RESPIRATORY: Denies any cough, hemoptysis, shortness of breath or dyspnea on exertion  GASTROINTESTINAL: As noted in the History of Present Illness  GENITOURINARY: No problems with urination  Denies any hematuria or dysuria  NEUROLOGIC: No dizziness or vertigo, denies headaches  MUSCULOSKELETAL: Denies any muscle or joint pain  SKIN: Denies skin rashes or itching  ENDOCRINE: Denies excessive thirst  Denies intolerance to heat or cold  PSYCHOSOCIAL: Denies depression or anxiety  Denies any recent memory loss         Historical Information   Past Medical History:   Diagnosis Date    Allergic     [place], soy, seasonal, Peanuts hurt my stomach     Allergic rhinitis     Anxiety 2004    COVID-19 2021    Depression 2018    Factor 5 Leiden mutation, heterozygous ([facility] [address] )     Generalized headaches     More frequent around my period    Headache(784 0) 2018    Hypertension 2004    Low back pain 2017    Mitral regurgitation 2022    Trace    Tricuspid regurgitation 2022    Trace    Varicella      Past Surgical History:   Procedure Laterality Date    VAGINAL DELIVERY      WISDOM TOOTH EXTRACTION       Social History   Social History     Substance and Sexual Activity   Alcohol Use Not Currently    Alcohol/week: 0 0 standard drinks     Social History     Substance and Sexual Activity   Drug Use Never     Social History     Tobacco Use   Smoking Status Former Smoker    Packs/day: 0 25    Years: 4 00    Pack years: 1 00    Types: Cigarettes    Quit date: 1997    Years since quittin 7   Smokeless Tobacco Never Used     Family History   Problem Relation Age of Onset    Coronary artery disease Father     Diabetes Father     Hypothyroidism Mother    Mehul Farnsworth Breast cancer Mother 61    Thyroid disease Mother     Clotting disorder Sister     Thrombosis Sister     ADD / ADHD Son     Asthma Son     No Known Problems Daughter     No Known Problems Maternal Grandmother     No Known Problems Maternal Grandfather     No Known Problems Paternal Grandmother     No Known Problems Paternal Grandfather     No Known Problems Sister  Throat cancer Maternal Aunt         age unknown    Melanoma Maternal Aunt 39    No Known Problems Maternal Aunt        Meds/Allergies       Current Outpatient Medications:     escitalopram (Lexapro) 10 mg tablet    lisinopril (ZESTRIL) 10 mg tablet    multivitamin (THERAGRAN) TABS    Omega-3 Fatty Acids (FISH OIL OMEGA-3) 1000 MG CAPS    Probiotic Product (PROBIOTIC-10) CAPS    Rimegepant Sulfate (Nurtec) 75 MG TBDP    Allergies   Allergen Reactions    Natural Vegetable Orange [Psyllium] Anaphylaxis    Other Hives and Other (See Comments)     Almonds - causes oral pain   Oranges - Hives    [place] Oil - Food Allergy Other (See Comments)    Latex            Objective     Blood pressure 126/78, pulse 83, temperature (!) 96 8 °F (36 °C), weight 93 3 kg (205 lb 9 6 oz)  Body mass index is 35 29 kg/m²  PHYSICAL EXAM:      General Appearance:   Alert, cooperative, no distress   HEENT:   Normocephalic, atraumatic, anicteric  Neck:  Supple, symmetrical, trachea midline   Lungs:   Clear to auscultation bilaterally; no rales, rhonchi or wheezing; respirations unlabored    Heart[de-identified]   Regular rate and rhythm; no murmur, rub, or gallop  Abdomen:   Soft, non-tender, non-distended; normal bowel sounds; no masses, no organomegaly    Genitalia:   Deferred    Rectal:   Deferred    Extremities:  No cyanosis, clubbing or edema    Pulses:  2+ and symmetric    Skin:  No jaundice, rashes, or lesions    Lymph nodes:  No palpable cervical lymphadenopathy        Lab Results:   No visits with results within 1 Day(s) from this visit     Latest known visit with results is:   Office Visit on 09/07/2022   Component Date Value     COLOR,UA 09/07/2022 yellow     CLARITY,UA 09/07/2022 clear     SPECIFIC GRAVITY,UA 09/07/2022 1 015      PH,UA 09/07/2022 6 0     LEUKOCYTE ESTERASE,UA 09/07/2022 negative     NITRITE,UA 09/07/2022 negative     GLUCOSE, UA 09/07/2022 negative     KETONES,UA 09/07/2022 negative     BILIRUBIN,UA 09/07/2022 negative     BLOOD,UA 09/07/2022 negative     POCT URINE PROTEIN 09/07/2022 negative     [facility] POCT UROBILINOGEN 09/07/2022 3 5          Radiology Results:   No results found

## 2022-10-05 NOTE — PATIENT INSTRUCTIONS
Scheduled date of 11/14/22 (as of today) 10/5/22  Physician performing Dr Ivey Ba:  Location of procedure  [facility] End:  Bowel prep reviewed with patient: phong/dulcolax  Instructions reviewed with patient by: MA  Clearances:

## 2022-10-21 ENCOUNTER — HOSPITAL ENCOUNTER (OUTPATIENT)
Dept: MAMMOGRAPHY | Facility: MEDICAL CENTER | Age: 47
Discharge: HOME | End: 2022-10-21
Payer: COMMERCIAL

## 2022-10-21 VITALS — BODY MASS INDEX: 35.12 KG/M2 | WEIGHT: 205.69 LBS | HEIGHT: 64 IN

## 2022-10-21 DIAGNOSIS — Z12.31 OTHER SCREENING MAMMOGRAM: ICD-10-CM

## 2022-10-21 PROCEDURE — 77067 SCR MAMMO BI INCL CAD: CPT

## 2022-10-21 PROCEDURE — 77063 BREAST TOMOSYNTHESIS BI: CPT

## 2022-11-11 RX ORDER — ONDANSETRON 2 MG/ML
4 INJECTION INTRAMUSCULAR; INTRAVENOUS ONCE AS NEEDED
Status: CANCELLED | OUTPATIENT
Start: 2022-11-11

## 2022-11-11 RX ORDER — SODIUM CHLORIDE 9 MG/ML
125 INJECTION, SOLUTION INTRAVENOUS CONTINUOUS
Status: CANCELLED | OUTPATIENT
Start: 2022-11-11

## 2022-11-14 ENCOUNTER — ANESTHESIA (OUTPATIENT)
Dept: GASTROENTEROLOGY | Facility: MEDICAL CENTER | Age: 47
End: 2022-11-14

## 2022-11-14 ENCOUNTER — HOSPITAL ENCOUNTER (OUTPATIENT)
Dept: GASTROENTEROLOGY | Facility: MEDICAL CENTER | Age: 47
Setting detail: OUTPATIENT SURGERY
Discharge: HOME | End: 2022-11-14

## 2022-11-14 ENCOUNTER — ANESTHESIA EVENT (OUTPATIENT)
Dept: GASTROENTEROLOGY | Facility: MEDICAL CENTER | Age: 47
End: 2022-11-14

## 2022-11-14 VITALS
HEIGHT: 65 IN | RESPIRATION RATE: 14 BRPM | WEIGHT: 205 LBS | BODY MASS INDEX: 34.16 KG/M2 | DIASTOLIC BLOOD PRESSURE: 78 MMHG | TEMPERATURE: 98.4 F | OXYGEN SATURATION: 100 % | SYSTOLIC BLOOD PRESSURE: 128 MMHG | HEART RATE: 69 BPM

## 2022-11-14 DIAGNOSIS — Z83.71 FAMILY HISTORY OF COLONIC POLYPS: ICD-10-CM

## 2022-11-14 LAB
EXT PREGNANCY TEST URINE: NEGATIVE
EXT. CONTROL: NORMAL

## 2022-11-14 RX ORDER — PROPOFOL 10 MG/ML
INJECTION, EMULSION INTRAVENOUS AS NEEDED
Status: DISCONTINUED | OUTPATIENT
Start: 2022-11-14 | End: 2022-11-14

## 2022-11-14 RX ORDER — PROPOFOL 10 MG/ML
INJECTION, EMULSION INTRAVENOUS CONTINUOUS PRN
Status: DISCONTINUED | OUTPATIENT
Start: 2022-11-14 | End: 2022-11-14

## 2022-11-14 RX ORDER — SODIUM CHLORIDE 9 MG/ML
125 INJECTION, SOLUTION INTRAVENOUS CONTINUOUS
Status: DISCONTINUED | OUTPATIENT
Start: 2022-11-14 | End: 2022-11-18 | Stop reason: HOSPADM

## 2022-11-14 RX ORDER — ONDANSETRON 2 MG/ML
4 INJECTION INTRAMUSCULAR; INTRAVENOUS ONCE AS NEEDED
Status: DISCONTINUED | OUTPATIENT
Start: 2022-11-14 | End: 2022-11-18 | Stop reason: HOSPADM

## 2022-11-14 RX ADMIN — PROPOFOL 120 MCG/KG/MIN: 10 INJECTION, EMULSION INTRAVENOUS at 14:04

## 2022-11-14 RX ADMIN — PROPOFOL 60 MG: 10 INJECTION, EMULSION INTRAVENOUS at 14:03

## 2022-11-14 RX ADMIN — PROPOFOL 30 MG: 10 INJECTION, EMULSION INTRAVENOUS at 14:07

## 2022-11-14 RX ADMIN — SODIUM CHLORIDE 125 ML/HR: 0.9 INJECTION, SOLUTION INTRAVENOUS at 13:41

## 2022-11-14 NOTE — H&P
History and Physical - [facility]  Diego Chew 55 y o  female MRN: [id number]                  HPI: Diego Chew is a 55y o  year old female who presents for family history of colon polyps      REVIEW OF SYSTEMS: Per the HPI, and otherwise unremarkable      Historical Information   Past Medical History:   Diagnosis Date   • Allergic     [place], soy, seasonal, Peanuts hurt my stomach    • Allergic rhinitis    • Anxiety 2004   • COVID-19 2021   • Depression 2018   • Factor 5 Leiden mutation, heterozygous ([address] )    • Generalized headaches     More frequent around my period   • Headache(784 0) 2018   • Hypertension 2004   • Low back pain 2017   • Mitral regurgitation 2022    Trace   • Thyroid nodule    • Tricuspid regurgitation 2022    Trace   • Varicella      Past Surgical History:   Procedure Laterality Date   • VAGINAL DELIVERY     • WISDOM TOOTH EXTRACTION       Social History   Social History     Substance and Sexual Activity   Alcohol Use Not Currently   • Alcohol/week: 0 0 standard drinks     Social History     Substance and Sexual Activity   Drug Use Never     Social History     Tobacco Use   Smoking Status Former Smoker   • Packs/day: 0 25   • Years: 4 00   • Pack years: 1 00   • Types: Cigarettes   • Quit date: 1997   • Years since quittin 8   Smokeless Tobacco Never Used     Family History   Problem Relation Age of Onset   • Hypothyroidism Mother    • Breast cancer Mother 61   • Thyroid disease Mother    • Coronary artery disease Father    • Diabetes Father    • Clotting disorder Sister    • Thrombosis Sister    • No Known Problems Sister    • No Known Problems Daughter    • No Known Problems Maternal Grandmother    • No Known Problems Maternal Grandfather    • No Known Problems Paternal Grandmother    • No Known Problems Paternal Grandfather    • ADD / ADHD Son    • Asthma Son    • Throat cancer Maternal Aunt         age unknown   • Melanoma Maternal Aunt 39   • No Known Problems Maternal Aunt        Meds/Allergies       Current Outpatient Medications:   •  escitalopram (Lexapro) 10 mg tablet  •  lisinopril (ZESTRIL) 10 mg tablet  •  multivitamin (THERAGRAN) TABS  •  Omega-3 Fatty Acids (FISH OIL OMEGA-3) 1000 MG CAPS  •  Probiotic Product (PROBIOTIC-10) CAPS  •  polyethylene glycol (Golytely) 4000 mL solution  •  Rimegepant Sulfate (Nurtec) 75 MG TBDP    Current Facility-Administered Medications:   •  sodium chloride 0 9 % infusion, 125 mL/hr, Intravenous, Continuous, 125 mL/hr at 11/14/22 1341    Allergies   Allergen Reactions   • Natural Vegetable Orange [Psyllium] Anaphylaxis   • Other Hives and Other (See Comments)     Almonds - causes oral pain   Oranges - Hives   • [place] Oil - Food Allergy Other (See Comments)   • Latex        Objective     /85   Pulse 78   Temp 98 4 °F (36 9 °C) (Temporal)   Resp 18   Ht 5' 5" (1 651 m)   Wt 93 kg (205 lb)   SpO2 100%   BMI 34 11 kg/m²       PHYSICAL EXAM    Gen: NAD  Head: NCAT  CV: RRR  CHEST: Clear  ABD: soft, NT/ND  EXT: no edema      ASSESSMENT/PLAN:  This is a 55y o  year old female here for colonoscopy, and she is stable and optimized for her procedure

## 2022-11-14 NOTE — ANESTHESIA POSTPROCEDURE EVALUATION
Post-Op Assessment Note    CV Status:  Stable    Pain management: adequate     Mental Status:  Alert and awake   Hydration Status:  Euvolemic   PONV Controlled:  Controlled   Airway Patency:  Patent   Two or more mitigation strategies used for obstructive sleep apnea   Post Op Vitals Reviewed: Yes      Staff: Anesthesiologist         No complications documented      BP      Temp      Pulse     Resp      SpO2      /78   Pulse 69   Temp 98 4 °F (36 9 °C) (Temporal)   Resp 14   Ht 5' 5" (1 651 m)   Wt 93 kg (205 lb)   SpO2 100%   BMI 34 11 kg/m²

## 2022-11-14 NOTE — ANESTHESIA PREPROCEDURE EVALUATION
Procedure:  COLONOSCOPY    Relevant Problems   ANESTHESIA (within normal limits)      CARDIO   (+) Essential hypertension   (+) Hyperlipidemia   (+) Mitral regurgitation   (+) Tricuspid regurgitation      NEURO/PSYCH   (+) Anxiety      Hematopoietic and Hemostatic   (+) Factor 5 Leiden mutation, heterozygous [facility])        Physical Exam    Airway    Mallampati score: II  TM Distance: <3 FB  Neck ROM: full     Dental   No notable dental hx     Cardiovascular  Rhythm: regular, Rate: normal, Cardiovascular exam normal    Pulmonary  Pulmonary exam normal Breath sounds clear to auscultation,     Other Findings        Anesthesia Plan  ASA Score- 2     Anesthesia Type- IV sedation with anesthesia with ASA Monitors  Additional Monitors:   Airway Plan:           Plan Factors-Exercise tolerance (METS): >4 METS  Chart reviewed  Existing labs reviewed  Patient summary reviewed  Patient is not a current smoker  Induction- intravenous  Postoperative Plan-     Informed Consent- Anesthetic plan and risks discussed with patient

## 2023-01-02 DIAGNOSIS — G43.009 MIGRAINE WITHOUT AURA AND WITHOUT STATUS MIGRAINOSUS, NOT INTRACTABLE: ICD-10-CM

## 2023-01-03 RX ORDER — RIMEGEPANT SULFATE 75 MG/75MG
TABLET, ORALLY DISINTEGRATING ORAL
Qty: 8 TABLET | Refills: 3 | Status: SHIPPED | OUTPATIENT
Start: 2023-01-03

## 2023-01-03 RX ORDER — RIMEGEPANT SULFATE 75 MG/75MG
75 TABLET, ORALLY DISINTEGRATING ORAL DAILY PRN
Qty: 12 TABLET | Refills: 0 | Status: SHIPPED | OUTPATIENT
Start: 2023-01-03 | End: 2023-01-03

## 2023-01-18 DIAGNOSIS — F41.9 ANXIETY: ICD-10-CM

## 2023-01-18 RX ORDER — ESCITALOPRAM OXALATE 10 MG/1
TABLET ORAL
Qty: 30 TABLET | Refills: 5 | Status: SHIPPED | OUTPATIENT
Start: 2023-01-18

## 2023-02-01 ENCOUNTER — PATIENT MESSAGE (OUTPATIENT)
Dept: FAMILY MEDICINE CLINIC | Facility: CLINIC | Age: 48
End: 2023-02-01

## 2023-02-01 DIAGNOSIS — N92.0 MENORRHAGIA WITH REGULAR CYCLE: Primary | ICD-10-CM

## 2023-02-03 ENCOUNTER — APPOINTMENT (OUTPATIENT)
Dept: LAB | Facility: CLINIC | Age: 48
End: 2023-02-03

## 2023-02-03 DIAGNOSIS — N92.0 MENORRHAGIA WITH REGULAR CYCLE: ICD-10-CM

## 2023-02-03 DIAGNOSIS — Z00.00 PHYSICAL EXAM, ANNUAL: ICD-10-CM

## 2023-02-03 LAB
ALBUMIN SERPL BCP-MCNC: 3.6 G/DL (ref 3.5–5)
ALP SERPL-CCNC: 46 U/L (ref 46–116)
ALT SERPL W P-5'-P-CCNC: 26 U/L (ref 12–78)
ANION GAP SERPL CALCULATED.3IONS-SCNC: 4 MMOL/L (ref 4–13)
AST SERPL W P-5'-P-CCNC: 19 U/L (ref 5–45)
BASOPHILS # BLD AUTO: 0.03 THOUSANDS/ÂΜL (ref 0–0.1)
BASOPHILS NFR BLD AUTO: 1 % (ref 0–1)
BILIRUB SERPL-MCNC: 0.34 MG/DL (ref 0.2–1)
BUN SERPL-MCNC: 15 MG/DL (ref 5–25)
CALCIUM SERPL-MCNC: 9.1 MG/DL (ref 8.3–10.1)
CHLORIDE SERPL-SCNC: 108 MMOL/L (ref 96–108)
CHOLEST SERPL-MCNC: 194 MG/DL
CO2 SERPL-SCNC: 28 MMOL/L (ref 21–32)
CREAT SERPL-MCNC: 1.04 MG/DL (ref 0.6–1.3)
EOSINOPHIL # BLD AUTO: 0.09 THOUSAND/ÂΜL (ref 0–0.61)
EOSINOPHIL NFR BLD AUTO: 2 % (ref 0–6)
ERYTHROCYTE [DISTWIDTH] IN BLOOD BY AUTOMATED COUNT: 12.8 % (ref 11.6–15.1)
EST. AVERAGE GLUCOSE BLD GHB EST-MCNC: 111 MG/DL
FERRITIN SERPL-MCNC: 23 NG/ML (ref 8–388)
GFR SERPL CREATININE-BSD FRML MDRD: 64 ML/MIN/1.73SQ M
GLUCOSE P FAST SERPL-MCNC: 103 MG/DL (ref 65–99)
HBA1C MFR BLD: 5.5 %
HCT VFR BLD AUTO: 41.6 % (ref 34.8–46.1)
HDLC SERPL-MCNC: 41 MG/DL
HGB BLD-MCNC: 13.2 G/DL (ref 11.5–15.4)
IMM GRANULOCYTES # BLD AUTO: 0.01 THOUSAND/UL (ref 0–0.2)
IMM GRANULOCYTES NFR BLD AUTO: 0 % (ref 0–2)
LDLC SERPL CALC-MCNC: 125 MG/DL (ref 0–100)
LYMPHOCYTES # BLD AUTO: 1.82 THOUSANDS/ÂΜL (ref 0.6–4.47)
LYMPHOCYTES NFR BLD AUTO: 41 % (ref 14–44)
MCH RBC QN AUTO: 28 PG (ref 26.8–34.3)
MCHC RBC AUTO-ENTMCNC: 31.7 G/DL (ref 31.4–37.4)
MCV RBC AUTO: 88 FL (ref 82–98)
MONOCYTES # BLD AUTO: 0.38 THOUSAND/ÂΜL (ref 0.17–1.22)
MONOCYTES NFR BLD AUTO: 9 % (ref 4–12)
NEUTROPHILS # BLD AUTO: 2.07 THOUSANDS/ÂΜL (ref 1.85–7.62)
NEUTS SEG NFR BLD AUTO: 47 % (ref 43–75)
NONHDLC SERPL-MCNC: 153 MG/DL
NRBC BLD AUTO-RTO: 0 /100 WBCS
PLATELET # BLD AUTO: 188 THOUSANDS/UL (ref 149–390)
PMV BLD AUTO: 12.6 FL (ref 8.9–12.7)
POTASSIUM SERPL-SCNC: 4.3 MMOL/L (ref 3.5–5.3)
PROT SERPL-MCNC: 7.1 G/DL (ref 6.4–8.4)
RBC # BLD AUTO: 4.72 MILLION/UL (ref 3.81–5.12)
SODIUM SERPL-SCNC: 140 MMOL/L (ref 135–147)
TRIGL SERPL-MCNC: 142 MG/DL
TSH SERPL DL<=0.05 MIU/L-ACNC: 1.19 UIU/ML (ref 0.45–4.5)
WBC # BLD AUTO: 4.4 THOUSAND/UL (ref 4.31–10.16)

## 2023-02-20 ENCOUNTER — OFFICE VISIT (OUTPATIENT)
Dept: URGENT CARE | Facility: CLINIC | Age: 48
End: 2023-02-20

## 2023-02-20 VITALS
TEMPERATURE: 97.5 F | DIASTOLIC BLOOD PRESSURE: 98 MMHG | OXYGEN SATURATION: 98 % | RESPIRATION RATE: 18 BRPM | BODY MASS INDEX: 33.32 KG/M2 | HEART RATE: 79 BPM | HEIGHT: 65 IN | SYSTOLIC BLOOD PRESSURE: 139 MMHG | WEIGHT: 200 LBS

## 2023-02-20 DIAGNOSIS — B96.89 BACTERIAL URI: Primary | ICD-10-CM

## 2023-02-20 DIAGNOSIS — J06.9 BACTERIAL URI: Primary | ICD-10-CM

## 2023-02-20 RX ORDER — AZITHROMYCIN 250 MG/1
TABLET, FILM COATED ORAL
Qty: 6 TABLET | Refills: 0 | Status: SHIPPED | OUTPATIENT
Start: 2023-02-20 | End: 2023-02-24

## 2023-02-20 RX ORDER — ALBUTEROL SULFATE 90 UG/1
2 AEROSOL, METERED RESPIRATORY (INHALATION) EVERY 6 HOURS PRN
Qty: 8.5 G | Refills: 0 | Status: SHIPPED | OUTPATIENT
Start: 2023-02-20

## 2023-02-20 RX ORDER — CEFDINIR 300 MG/1
300 CAPSULE ORAL EVERY 12 HOURS SCHEDULED
Qty: 14 CAPSULE | Refills: 0 | Status: SHIPPED | OUTPATIENT
Start: 2023-02-20 | End: 2023-02-20 | Stop reason: ALTCHOICE

## 2023-02-20 NOTE — PROGRESS NOTES
[facility]        NAME: Chad Garcia is a 52 y o  female  : 1975    MRN: [id number]  DATE: 2023  TIME: 2:43 PM    Assessment and Plan   Bacterial URI [J06 9, B96 89]  1  Bacterial URI  albuterol (ProAir HFA) 90 mcg/act inhaler    azithromycin (ZITHROMAX) 250 mg tablet    DISCONTINUED: cefdinir (OMNICEF) 300 mg capsule        Lungs clear on exam today   Albuterol inhaler sent to pharmacy to use prn   Originally sent cefdinir to pharmacy, however, on [address] changed to zithromax   Instructions provided   F/u with pcp    Patient Instructions     Follow up with PCP in 3-5 days  Proceed to  ER if symptoms worsen  Chief Complaint     Chief Complaint   Patient presents with   • Cold Like Symptoms     Patient states that last Monday she started with head congestion  Taking OTC meds with no relief  Feeling worse  Took at home COVID and it was negative  Thinks she may need inhaler due to chest congestion         History of Present Illness   Chad Garcia presents to the clinic c/o    Cold Like Symptoms (Patient states that last Monday she started with head congestion  Taking OTC meds with no relief  Feeling worse  Took at home COVID and it was negative  Thinks she may need inhaler due to chest congestion)  Noted some wheezing when climbing up the stairs at the [employer] [place] last on Saturday night  Review of Systems   Review of Systems   All other systems reviewed and are negative          Current Medications     Long-Term Medications   Medication Sig Dispense Refill   • escitalopram (LEXAPRO) 10 mg tablet take 1 tablet by mouth once daily (Patient taking differently: Take 5 mg by mouth daily Taking 5 mg daily) 30 tablet 5   • lisinopril (ZESTRIL) 10 mg tablet Take 1 tablet (10 mg total) by mouth daily 90 tablet 3   • multivitamin (THERAGRAN) TABS Take 1 tablet by mouth daily     • [DISCONTINUED] polyethylene glycol (Golytely) 4000 mL solution Take 4,000 mL by mouth once for 1 dose Take 4000 mL by mouth once for 1 dose  Use as directed 4000 mL 0       Current Allergies     Allergies as of 02/20/2023 - Reviewed 02/20/2023   Allergen Reaction Noted   • Natural vegetable orange [psyllium] Anaphylaxis 01/19/2022   • Other Hives and Other (See Comments) 12/18/2017   • [place] oil - food allergy Other (See Comments) 12/10/2015   • Latex  12/10/2015            The following portions of the patient's history were reviewed and updated as appropriate: allergies, current medications, past family history, past medical history, past social history, past surgical history and problem list     Objective   /98   Pulse 79   Temp 97 5 °F (36 4 °C) (Tympanic)   Resp 18   Ht 5' 5" (1 651 m)   Wt 90 7 kg (200 lb)   LMP 01/24/2023   SpO2 98%   BMI 33 28 kg/m²        Physical Exam     Physical Exam  Vitals and nursing note reviewed  Constitutional:       Appearance: Normal appearance  She is well-developed  HENT:      Head: Normocephalic and atraumatic  Right Ear: Hearing, tympanic membrane, ear canal and external ear normal       Left Ear: Hearing, tympanic membrane, ear canal and external ear normal       Nose: Mucosal edema and congestion present  Right Sinus: Maxillary sinus tenderness present  No frontal sinus tenderness  Left Sinus: Maxillary sinus tenderness present  No frontal sinus tenderness  Mouth/Throat:      Lips: Pink  Mouth: Mucous membranes are moist       Pharynx: Oropharynx is clear  Eyes:      General: Lids are normal       Extraocular Movements: Extraocular movements intact  Conjunctiva/sclera: Conjunctivae normal       Pupils: Pupils are equal, round, and reactive to light  Cardiovascular:      Rate and Rhythm: Normal rate and regular rhythm  Pulses: Normal pulses  Heart sounds: Normal heart sounds, S1 normal and S2 normal    Pulmonary:      Effort: Pulmonary effort is normal       Breath sounds: Normal breath sounds     Abdominal: General: Abdomen is flat  Musculoskeletal:      Cervical back: Normal range of motion and neck supple  Skin:     General: Skin is warm and dry  Neurological:      Mental Status: She is alert and oriented to person, place, and time  Psychiatric:         Speech: Speech normal          Behavior: Behavior normal  Behavior is cooperative  Thought Content:  Thought content normal          Judgment: Judgment normal

## 2023-02-20 NOTE — PATIENT INSTRUCTIONS
Cold Symptoms   AMBULATORY CARE:   Cold symptoms  include sneezing, dry throat, a stuffy nose, headache, watery eyes, and a cough  Your cough may be dry, or you may cough up mucus  You may also have muscle aches, joint pain, and tiredness  Rarely, you may have a fever  Cold symptoms occur from inflammation in your upper respiratory system caused by a virus  Most colds go away without treatment  Seek care immediately if:   You have increased tiredness and weakness  You are unable to eat  Your heart is beating much faster than usual for you  You see white spots in the back of your throat and your neck is swollen and sore to the touch  You see pinpoint or larger reddish-purple dots on your skin  Contact your healthcare provider if:   You have a fever higher than 102°F (38 9°C)  You have new or worsening shortness of breath  You have thick nasal drainage for more than 2 days  Your symptoms do not improve or get worse within 5 days  You have questions or concerns about your condition or care  Treatment for cold symptoms  may include NSAIDS to decrease muscle aches and fever  Cold medicines may also be given to decrease coughing, nasal stuffiness, sneezing, and a runny nose  Manage your cold symptoms: The following may help relieve cold symptoms, such as a dry throat and congestion:  Gargle with mouthwash or warm salt water as directed  Suck on throat lozenges or hard candy  Use a cold or warm vaporizer or humidifier to ease your breathing  Rest for at least 2 days and then as needed to decrease tiredness and weakness  Use petroleum based jelly around your nostrils to decrease irritation from blowing your nose  Drink plenty of liquids  Liquids will help thin and loosen thick mucus so you can cough it up  Liquids will also keep you hydrated  Ask your healthcare provider which liquids are best for you and how much to drink each day      Prevent the spread of germs by washing your hands often  You can spread your cold germs to others for at least 3 days after your symptoms start  Do not share items, such as eating utensils  Cover your nose and mouth when you cough or sneeze using the crook of your elbow instead of your hands  Throw used tissues in the garbage  Do not smoke:  Smoking may worsen your symptoms and increase the length of time you feel sick  Talk with your healthcare provider if you need help to stop smoking  Follow up with your doctor as directed:  Write down your questions so you remember to ask them during your visits  © Copyright [employer] 2022 Information is for End User's use only and may not be sold, redistributed or otherwise used for commercial purposes  The above information is an  only  It is not intended as medical advice for individual conditions or treatments  Talk to your doctor, nurse or pharmacist before following any medical regimen to see if it is safe and effective for you

## 2023-04-28 ENCOUNTER — OFFICE VISIT (OUTPATIENT)
Dept: CARDIOLOGY CLINIC | Facility: CLINIC | Age: 48
End: 2023-04-28

## 2023-04-28 VITALS
WEIGHT: 202 LBS | HEART RATE: 77 BPM | HEIGHT: 65 IN | DIASTOLIC BLOOD PRESSURE: 86 MMHG | BODY MASS INDEX: 33.66 KG/M2 | OXYGEN SATURATION: 98 % | SYSTOLIC BLOOD PRESSURE: 140 MMHG

## 2023-04-28 DIAGNOSIS — R00.2 PALPITATIONS: ICD-10-CM

## 2023-04-28 DIAGNOSIS — E78.2 MIXED HYPERLIPIDEMIA: ICD-10-CM

## 2023-04-28 DIAGNOSIS — I10 ESSENTIAL HYPERTENSION: Primary | ICD-10-CM

## 2023-04-28 NOTE — PROGRESS NOTES
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 palpitations  Exercises included squats, kettle bells, weights etc   She felt her heart racing in the middle of her workout which she felt was a new symptom  She was also not sleeping much the night before  The following day she felt less energy and ultimately had URI symptoms and tested positive for COVID  Reported a dull pain over a pinpoint area along the left sternum and her heart \"beating weird\" during workouts  Drinking 2 cups of coffee which she felt exacerbated palpitations  Thyroid function was normal   Cardiac history: She has a history of preeclampsia with her first born child but went almost full-term  No preeclampsia during her second pregnancy but did have elevated blood pressure  Social history: Smoked very briefly in college  Does not consume alcohol  Father had open heart surgery in late 62s to early 76s  OV-4/20/2022: She feels well since our last office visit and after being started on Lexapro  She has started working out again increased check about 3 times per week without any physical limitations  Her palpitations have not recurred for quite some time until recently she has been taking her medications for several days and used Flonase which resulted in some palpitations  Interval history: Follow up for palpitations  Has some intermittent palpitations but not affecting QoL  Going to the gym and doing aqua aerobics, Michaela, yoga  Denies any exertional chest pain or shortness of breath    She denies any visual changes, lightheadedness, " syncope, orthopnea, PND  Not currently checking her blood pressure at home  She does notice that she has some leg swelling if she has a high sodium meal   She denies any nausea, vomiting, diarrhea, blood in the stool  She does complain of constipation  She has had a colonoscopy within the last year      The following portions of the patient's history were reviewed and updated as appropriate: allergies, current medications, past family history, past medical history, past social history, past surgical history, and problem list     SINCE LAST OV I REVIEWED WITH THE PATIENT THE INTERIM LABS, TEST RESULTS, CONSULTANT(S) NOTES AND PERFORMED AN INTERIM REVIEW OF HISTORY    Past Medical History:   Diagnosis Date   • Allergic     Martin, soy, seasonal, Peanuts hurt my stomach    • Allergic rhinitis    • Anxiety    • COVID-19 2021   • Depression    • Factor 5 Leiden mutation, [facility] ([facility] )    • Generalized headaches     More frequent around my period   • Headache(784 0)    • Hypertension 2004   • Low back pain 2017   • Mitral regurgitation 2022    Trace   • Thyroid nodule    • Tricuspid regurgitation 2022    Trace   • Varicella        Past Surgical History:   Procedure Laterality Date   • VAGINAL DELIVERY     • WISDOM TOOTH EXTRACTION         Social History     Socioeconomic History   • Marital status: /Civil Union     Spouse name: Nathan Saldana    • Number of children: 2   • Years of education: Not on file   • Highest education level: Not on file   Occupational History   • Not on file   Tobacco Use   • Smoking status: Former     Packs/day: 0 25     Years: 4 00     Pack years: 1 00     Types: Cigarettes     Quit date: 1997     Years since quittin 3   • Smokeless tobacco: Never   Vaping Use   • Vaping Use: Never used   Substance and Sexual Activity   • Alcohol use: Not Currently     Alcohol/week: 0 0 standard drinks   • Drug use: Never   • Sexual activity: Yes Partners: Male     Birth control/protection: Male Sterilization, None   Other Topics Concern   • Not on file   Social History Narrative   • Not on file     Social Determinants of Health     Financial Resource Strain: Not on file   Food Insecurity: Not on file   Transportation Needs: Not on file   Physical Activity: Not on file   Stress: Not on file   Social Connections: Not on file   Intimate Partner Violence: Not on file   Housing Stability: Not on file       Family History   Problem Relation Age of Onset   • Hypothyroidism Mother    • Breast cancer Mother 61   • Thyroid disease Mother    • Coronary artery disease Father    • Diabetes Father    • Clotting disorder Sister    • Thrombosis Sister    • No Known Problems Sister    • No Known Problems Daughter    • No Known Problems Maternal Grandmother    • No Known Problems Maternal Grandfather    • No Known Problems Paternal Grandmother    • No Known Problems Paternal Grandfather    • ADD / ADHD Son    • Asthma Son    • Throat cancer Maternal Aunt         age unknown   • Melanoma Maternal Aunt 39   • No Known Problems Maternal Aunt        Allergies   Allergen Reactions   • Other Hives and Other (See Comments)     Almonds - causes oral pain   Oranges - Hives   • [place] Oil - Food Allergy Other (See Comments)   • Latex        Current Outpatient Medications   Medication Sig Dispense Refill   • escitalopram (LEXAPRO) 10 mg tablet take 1 tablet by mouth once daily (Patient taking differently: Take 5 mg by mouth daily Taking 5 mg daily) 30 tablet 5   • lisinopril (ZESTRIL) 10 mg tablet Take 1 tablet (10 mg total) by mouth daily 90 tablet 3   • multivitamin (THERAGRAN) TABS Take 1 tablet by mouth daily     • Nurtec 75 MG TBDP take 1 tablet by mouth daily if needed for migraines - MAX 1 TABLET PER 24 HOURS 8 tablet 3   • Probiotic Product (PROBIOTIC-10) CAPS Take 1 capsule by mouth daily      • Omega-3 Fatty Acids (FISH OIL OMEGA-3) 1000 MG CAPS Take 2 g by mouth daily "(Patient not taking: Reported on 2/20/2023)       No current facility-administered medications for this visit  /86   Pulse 77   Ht 5' 5\" (1 651 m)   Wt 91 6 kg (202 lb)   SpO2 98%   BMI 33 61 kg/m²     Review of Systems   All other systems reviewed and are negative  Physical Exam  Vitals reviewed  Constitutional:       General: She is not in acute distress  Appearance: Normal appearance  HENT:      Head: Normocephalic and atraumatic  Eyes:      Extraocular Movements: Extraocular movements intact  Neck:      Vascular: No carotid bruit  Cardiovascular:      Rate and Rhythm: Normal rate and regular rhythm  Pulses: Normal pulses  Heart sounds: Normal heart sounds  No murmur heard  No gallop  Pulmonary:      Effort: Pulmonary effort is normal  No respiratory distress  Breath sounds: Normal breath sounds  No wheezing or rales  Abdominal:      General: Abdomen is flat  Bowel sounds are normal  There is no distension  Palpations: Abdomen is soft  Tenderness: There is no abdominal tenderness  Musculoskeletal:      Right lower leg: No edema  Left lower leg: No edema  Skin:     General: Skin is warm and dry  Neurological:      Mental Status: She is alert     Psychiatric:         Mood and Affect: Mood normal          Behavior: Behavior normal           Lab Results   Component Value Date    K 4 3 02/03/2023     02/03/2023    CO2 28 02/03/2023    BUN 15 02/03/2023    CREATININE 1 04 02/03/2023    CALCIUM 9 1 02/03/2023    ALT 26 02/03/2023    AST 19 02/03/2023       Lab Results   Component Value Date    HDL 41 (L) 02/03/2023    LDLCALC 125 (H) 02/03/2023    TRIG 142 02/03/2023       Lab Results   Component Value Date    WBC 4 40 02/03/2023    HGB 13 2 02/03/2023    HCT 41 6 02/03/2023     02/03/2023       Lab Results   Component Value Date     02/03/2023    HGBA1C 5 5 02/03/2023     Cardiac studies:  Results for orders placed or " performed in visit on 04/28/23   POCT ECG    Impression    Normal sinus rhythm  Normal axis  Normal R wave progression     24 Hour Holter Monitor - 2/9/22:  Average heart rate:  78 beats/minute  Lowest heart rate:  58 beats/minute  Highest  heart rate:  160 beats/minute   Longest RR interval:  1 1 sec      PREDOMINANT RHYTHM:  Normal sinus   ATRIOVENTRICULAR AND INTERVENTRICULAR CONDUCTION:  Normal   The recording showed no occurrence of atrial fibrillation/flutter,  prolonged pauses or significant AV block      BRADYCARDIA:   Total duration:  20 minute 48 seconds  Slowest episode:  occurred at 10:31 p m ,  and lasted 1 minute 32 seconds (minimum heart rate of 58 bpm)   This corresponded with sinus bradycardia with no evidence of heart block      TACHYCARDIA:  Total duration:  1 hour 14 minutes  Fastest episode: occurred at 5:16 a m ,  and lasted 1 minute 11 seconds (maximum heart rate of 160 bpm)   This corresponded with sinus tachycardia (patient reported that she was working out at that time)        SUPRAVENTRICULAR ECTOPY accounted for 0 0 % of heart beats recorded  Total number: 1  Supraventricular Couplets: 0   Bigeminy: 0   Trigeminy: 0   Runs: 0     VENTRICULAR ECTOPY accounted for 0 0 % of the recorded heart beats  Total number: 27  Isolated premature ventricular contractions: 27  Interpolated premature ventricular contractions: 0  Ventricular Couplets: 0   Bigeminy: 0   Trigeminy: 0   Runs: 0    PATIENT DIARY/SYMPTOMS:  The patient diary was returned with several activities but no symptoms reported        IMPRESSION:     1) Overall normal Holter monitor findings  Predominant rhythm was normal sinus with physiologic variation in heart rate  2) There was trivial (normal), supraventricular and ventricular ectopy as described  3) Patient reported that she experienced no symptoms during the monitor period      TTE - 1/13/2022:  Findings  Left Ventricle Left ventricular cavity size is normal  Wall thickness is normal  The left ventricular ejection fraction is 65%  Systolic function is normal   Wall motion is normal  Diastolic function is normal    Right Ventricle Right ventricular cavity size is normal  Systolic function is normal  Wall thickness is normal    Left Atrium The atrium is mildly dilated  Right Atrium The atrium is normal in size  Aortic Valve The aortic valve is trileaflet  The leaflets are not thickened  The leaflets are not calcified  The leaflets exhibit normal mobility  There is no evidence of regurgitation  There is no evidence of stenosis  Mitral Valve The mitral valve has normal structure and function  There is trace regurgitation  There is no evidence of stenosis  Tricuspid Valve Tricuspid valve structure is normal  There is trace regurgitation  There is no evidence of stenosis  Pulmonic Valve Pulmonic valve structure is normal  There is no evidence of regurgitation  There is no evidence of stenosis  Ascending Aorta The aortic root is normal in size  IVC/SVC The inferior vena cava is normal in size  Pericardium There is no pericardial effusion  The pericardium is normal in appearance  Pulmonary Veins The pulmonary veins have normal venous flow  Flow pattern is normal        ASSESSMENT AND PLAN:  Ailyn Shell was seen today for follow-up and palpitations  Diagnoses and all orders for this visit:    Essential hypertension: We repeated her blood pressure and it remained elevated  She is on lisinopril 10 mg which she takes nightly  We had an extensive discussion about diet, moderate intensity physical activity, weight loss  We also reviewed lowering sodium intake in the diet which can also lead to improvement in blood pressure  She will continue to keep a log of her BP and may need further up titration of her lisinopril  Mixed hyperlipidemia:  Lipids are improved from last year   Her lipid panel shows total cholesterol 194, TGs 142, HDL 41,    10 year ASCVD risk 2%  She has a history of preeclampsia and we discussed that this is a risk factor for future cardiovascular disease, but no indication for statin therapy at this time  Palpitations: These have significantly improved and she does not find them bothersome  Palpitations first started after she received COVID-19 booster and subsequently contracted COVID from a family member within a short period  Holter monitor shows sinus rhythm with rare ectopic activity  No additional work-up required at this time      Agapito Robert MD

## 2023-04-28 NOTE — PATIENT INSTRUCTIONS
You were seen today in the Cardiology office for follow up  I am glad to hear you're doing well  Your cholesterol panel looks overall good  The LDL cholesterol is mildly elevated  We can improve this with healthy diet, exercise, and weight loss  Your blood pressure is mildly elevated and I do recommend keeping an eye on this at home  Please continue your current cardiac medications as prescribed  Thank you for choosing [address]  Please call our office or use [employer] with any questions

## 2023-05-19 ENCOUNTER — TELEPHONE (OUTPATIENT)
Dept: FAMILY MEDICINE CLINIC | Facility: CLINIC | Age: 48
End: 2023-05-19

## 2023-05-19 ENCOUNTER — OFFICE VISIT (OUTPATIENT)
Dept: FAMILY MEDICINE CLINIC | Facility: CLINIC | Age: 48
End: 2023-05-19

## 2023-05-19 VITALS
WEIGHT: 202 LBS | DIASTOLIC BLOOD PRESSURE: 90 MMHG | BODY MASS INDEX: 33.66 KG/M2 | TEMPERATURE: 98.7 F | SYSTOLIC BLOOD PRESSURE: 146 MMHG | HEART RATE: 88 BPM | OXYGEN SATURATION: 99 % | HEIGHT: 65 IN | RESPIRATION RATE: 16 BRPM

## 2023-05-19 DIAGNOSIS — I10 ESSENTIAL HYPERTENSION: Primary | ICD-10-CM

## 2023-05-19 RX ORDER — LISINOPRIL 20 MG/1
20 TABLET ORAL DAILY
Qty: 30 TABLET | Refills: 3 | Status: SHIPPED | OUTPATIENT
Start: 2023-05-19

## 2023-05-19 NOTE — TELEPHONE ENCOUNTER
Received call from patient regarding hypertension  Per patient, her blood pressure readings have been 138/'s/90's  Patient states she has been having hot flashes and gets headaches afterward  Patient denies chest pain, drowsiness, confusion, SOB, weakness, epistaxis  Patient has been scheduled by clerical staff for an appointment today @ 03:40PM  Please advise with further instruction

## 2023-05-19 NOTE — PROGRESS NOTES
"Name: Katherin Agustin      : 1975      MRN: [id number]  Encounter Provider: Alejandro Aguilar DO  Encounter Date: 2023   Encounter department: [address] [place] [address]  Essential hypertension  Comments:  increase lisinopril to 20mg daily  f/u 1 mo  Orders:  -     lisinopril (ZESTRIL) 20 mg tablet; Take 1 tablet (20 mg total) by mouth daily           Subjective      HPI   Pt presents noting her bps have been higher lately--in 637O systolic  No chest pain, shortness of breath, n/v, abd pain, visual changes, paresthesias, weakness  Review of Systems  See hpi    Current Outpatient Medications on File Prior to Visit   Medication Sig   • escitalopram (LEXAPRO) 10 mg tablet take 1 tablet by mouth once daily (Patient taking differently: Take 5 mg by mouth daily Taking 5 mg daily)   • multivitamin (THERAGRAN) TABS Take 1 tablet by mouth daily   • Nurtec 75 MG TBDP take 1 tablet by mouth daily if needed for migraines - MAX 1 TABLET PER 24 HOURS   • Omega-3 Fatty Acids (FISH OIL OMEGA-3) 1000 MG CAPS Take 2 g by mouth daily   • Probiotic Product (PROBIOTIC-10) CAPS Take 1 capsule by mouth daily    • [DISCONTINUED] polyethylene glycol (Golytely) 4000 mL solution Take 4,000 mL by mouth once for 1 dose Take 4000 mL by mouth once for 1 dose  Use as directed       Objective     /90   Pulse 88   Temp 98 7 °F (37 1 °C)   Resp 16   Ht 5' 5\" (1 651 m)   Wt 91 6 kg (202 lb)   SpO2 99%   BMI 33 61 kg/m²     Physical Exam  Constitutional:       Appearance: Normal appearance  HENT:      Head: Normocephalic and atraumatic  Right Ear: Tympanic membrane, ear canal and external ear normal       Left Ear: Tympanic membrane, ear canal and external ear normal       Nose: Nose normal       Mouth/Throat:      Mouth: Mucous membranes are moist       Pharynx: No oropharyngeal exudate or posterior oropharyngeal erythema  Eyes:      Extraocular Movements: Extraocular movements intact   " Conjunctiva/sclera: Conjunctivae normal       Pupils: Pupils are equal, round, and reactive to light  Cardiovascular:      Rate and Rhythm: Normal rate and regular rhythm  Heart sounds: No murmur heard  No friction rub  No gallop  Pulmonary:      Effort: Pulmonary effort is normal       Breath sounds: No wheezing, rhonchi or rales  Abdominal:      General: Abdomen is flat  Bowel sounds are normal    Musculoskeletal:      Cervical back: Normal range of motion  Lymphadenopathy:      Cervical: No cervical adenopathy  Neurological:      General: No focal deficit present  Mental Status: She is alert and oriented to person, place, and time  Cranial Nerves: No cranial nerve deficit         Tristin Becker, DO

## 2023-06-27 ENCOUNTER — OFFICE VISIT (OUTPATIENT)
Dept: FAMILY MEDICINE CLINIC | Facility: CLINIC | Age: 48
End: 2023-06-27
Payer: COMMERCIAL

## 2023-06-27 VITALS
RESPIRATION RATE: 20 BRPM | OXYGEN SATURATION: 99 % | SYSTOLIC BLOOD PRESSURE: 138 MMHG | DIASTOLIC BLOOD PRESSURE: 98 MMHG | HEIGHT: 65 IN | HEART RATE: 90 BPM | BODY MASS INDEX: 34.29 KG/M2 | WEIGHT: 205.8 LBS | TEMPERATURE: 97.9 F

## 2023-06-27 DIAGNOSIS — D68.51 FACTOR 5 LEIDEN MUTATION, HETEROZYGOUS (HCC): ICD-10-CM

## 2023-06-27 DIAGNOSIS — I10 ESSENTIAL HYPERTENSION: ICD-10-CM

## 2023-06-27 DIAGNOSIS — M54.50 LOW BACK PAIN WITHOUT SCIATICA, UNSPECIFIED BACK PAIN LATERALITY, UNSPECIFIED CHRONICITY: Primary | ICD-10-CM

## 2023-06-27 PROCEDURE — 99214 OFFICE O/P EST MOD 30 MIN: CPT | Performed by: FAMILY MEDICINE

## 2023-06-27 RX ORDER — CYCLOBENZAPRINE HCL 10 MG
10 TABLET ORAL 3 TIMES DAILY PRN
Qty: 30 TABLET | Refills: 0 | Status: SHIPPED | OUTPATIENT
Start: 2023-06-27

## 2023-06-27 RX ORDER — NAPROXEN 500 MG/1
500 TABLET ORAL 2 TIMES DAILY WITH MEALS
Qty: 60 TABLET | Refills: 0 | Status: SHIPPED | OUTPATIENT
Start: 2023-06-27

## 2023-06-27 NOTE — PROGRESS NOTES
Name: Kevin Rodriguez      : 1975      MRN: [id number]  Encounter Provider: Ariadna Gomez DO  Encounter Date: 2023   Encounter department: [address] [place] [address]  Low back pain without sciatica, unspecified back pain laterality, unspecified chronicity  Comments:  naproxen BID x 1 week with food  flexeril prn  refer to PT  Orders:  -     naproxen (Naprosyn) 500 mg tablet; Take 1 tablet (500 mg total) by mouth 2 (two) times a day with meals  -     cyclobenzaprine (FLEXERIL) 10 mg tablet; Take 1 tablet (10 mg total) by mouth 3 (three) times a day as needed for muscle spasms  -     Ambulatory Referral to Physical Therapy; Future    2  Essential hypertension  Comments:  above goal on my read today, but NSAIDS/Pain  check bps at home and send to me in 2 weeks  take lisinopril daily    3  Factor 5 Leiden mutation, heterozygous ([address] )           Subjective      HPI   Pt c/o b/l low back pain  Doesn't radiate  No paresthesias or weakness  Doing PT  Has taken flexeril prn and 2 advil which helped  Has been driving a ton over the last month which has flared it up  Pt's blood pressure slightly high today, but on NSAIDS and in pain  No chest pain, shortness of breath, n/v, abd pain, visual changes, paresthesias, weakness  Hasn't been taking lisinopril regularly            Review of Systems  See hpi; all other systems negative    Current Outpatient Medications on File Prior to Visit   Medication Sig   • escitalopram (LEXAPRO) 10 mg tablet take 1 tablet by mouth once daily (Patient taking differently: Take 5 mg by mouth daily Taking 5 mg daily)   • lisinopril (ZESTRIL) 20 mg tablet Take 1 tablet (20 mg total) by mouth daily   • multivitamin (THERAGRAN) TABS Take 1 tablet by mouth daily   • Nurtec 75 MG TBDP take 1 tablet by mouth daily if needed for migraines - MAX 1 TABLET PER 24 HOURS   • Omega-3 Fatty Acids (FISH OIL OMEGA-3) 1000 MG CAPS Take 2 g by mouth daily   • "Probiotic Product (PROBIOTIC-10) CAPS Take 1 capsule by mouth daily    • [DISCONTINUED] polyethylene glycol (Golytely) 4000 mL solution Take 4,000 mL by mouth once for 1 dose Take 4000 mL by mouth once for 1 dose  Use as directed       Objective     /98   Pulse 90   Temp 97 9 °F (36 6 °C) (Temporal)   Resp 20   Ht 5' 5\" (1 651 m)   Wt 93 4 kg (205 lb 12 8 oz)   SpO2 99%   BMI 34 25 kg/m²     Physical Exam  Constitutional:       General: She is not in acute distress  Appearance: She is well-developed  HENT:      Head: Normocephalic and atraumatic  Right Ear: Tympanic membrane, ear canal and external ear normal       Left Ear: Tympanic membrane, ear canal and external ear normal       Nose: Nose normal       Mouth/Throat:      Pharynx: No oropharyngeal exudate  Eyes:      Conjunctiva/sclera: Conjunctivae normal       Pupils: Pupils are equal, round, and reactive to light  Neck:      Thyroid: No thyromegaly  Vascular: No carotid bruit or JVD  Cardiovascular:      Rate and Rhythm: Regular rhythm  Heart sounds: S1 normal and S2 normal  No murmur heard  No friction rub  No gallop  No S3 or S4 sounds  Pulmonary:      Effort: Pulmonary effort is normal       Breath sounds: Normal breath sounds  No wheezing, rhonchi or rales  Abdominal:      General: Bowel sounds are normal  There is no distension  Palpations: Abdomen is soft  Tenderness: There is no abdominal tenderness  Musculoskeletal:      Comments: Decrease b/l sidebending, f/e  L spine   Lymphadenopathy:      Cervical: No cervical adenopathy  Neurological:      Mental Status: She is alert and oriented to person, place, and time  Cranial Nerves: No cranial nerve deficit  Sensory: No sensory deficit  Deep Tendon Reflexes: Reflexes are normal and symmetric         Vallie Osier, DO  "

## 2023-06-27 NOTE — PATIENT INSTRUCTIONS
Naproxen twice daily with food x 1 week  Flexeril as needed  Continue lisinopril 20mg daily  Take blood pressure at home and send me bps in 2 weeks

## 2023-06-28 ENCOUNTER — TELEPHONE (OUTPATIENT)
Dept: ADMINISTRATIVE | Facility: OTHER | Age: 48
End: 2023-06-28

## 2023-06-28 NOTE — TELEPHONE ENCOUNTER
Upon review of the In Basket request and the patient's chart, initial outreach has been made via fax to facility  Please see Contacts section for details       Thank you  Reyes Way MA

## 2023-06-28 NOTE — TELEPHONE ENCOUNTER
----- Message from [facility] sent at 6/27/2023  1:35 PM EDT -----  06/27/23 1:35 PM    Hello, our patient Cameron Medrano has had the COVID vaccine completed/performed  Please assist in updating the patient chart by making an External outreach to [facility]       Thank you,  Pennie Tam  PG MAIKEL Vasquez

## 2023-06-28 NOTE — LETTER
Vaccination Request Form: [id number] aka SARS-CoV-2 (Anise Ana or Josh Riggs or [employer])      Date Requested: 23  Patient: Major Medici  Patient : 1975   Referring Provider: Gloria Justin DO       The above patient has informed us that they have had their   most recent COVID-19 aka SARS-CoV-2 (Anise Ana or Josh Riggs or [facility] [employer]) administered at your facility  Please   complete this form and attach all corresponding documentation  Date of Vaccine(s) Given  ______________________________    Lot Number(s) _______________________________________    Manufacture(s) ______________________________________    Dose Amount (s) _____________________________________    Expiration Date(s) ____________________________________    Comments __________________________________________________________  ____________________________________________________________________  ____________________________________________________________________  ____________________________________________________________________    Administering Facility  ________________________________________________    Vaccine Administered By (print name) ___________________________________      Form Completed By (print name) _______________________________________      Signature ___________________________________________________________      These reports are needed for  compliance  Please fax this completed form and a copy of the Vaccine Document(s) to our office located at [place] [address] as soon as possible to Fax [phone] attention Radha: Phone [phone]    We thank you for your assistance in treating our mutual patient

## 2023-06-29 NOTE — TELEPHONE ENCOUNTER
Upon review of the In Basket request we were able to locate, review, and update the patient chart as requested for Immunization(s) Covid  Any additional questions or concerns should be emailed to the Practice Liaisons via the appropriate education email address, please do not reply via In Basket      Thank you  Rolan Gibson MA

## 2023-07-19 DIAGNOSIS — I10 ESSENTIAL HYPERTENSION: Primary | ICD-10-CM

## 2023-07-19 RX ORDER — LISINOPRIL 40 MG/1
40 TABLET ORAL DAILY
Qty: 30 TABLET | Refills: 3 | Status: SHIPPED | OUTPATIENT
Start: 2023-07-19

## 2023-09-07 DIAGNOSIS — G43.009 MIGRAINE WITHOUT AURA AND WITHOUT STATUS MIGRAINOSUS, NOT INTRACTABLE: ICD-10-CM

## 2023-09-07 RX ORDER — RIMEGEPANT SULFATE 75 MG/75MG
75 TABLET, ORALLY DISINTEGRATING ORAL ONCE
Qty: 8 TABLET | Refills: 3 | Status: SHIPPED | OUTPATIENT
Start: 2023-09-07 | End: 2023-09-07

## 2023-09-07 RX ORDER — RIMEGEPANT SULFATE 75 MG/75MG
TABLET, ORALLY DISINTEGRATING ORAL
Qty: 8 TABLET | Refills: 3 | OUTPATIENT
Start: 2023-09-07

## 2023-10-06 NOTE — PATIENT INSTRUCTIONS
If you want to wean the lexapro, decrease to 5mg and stay there for a month    Then stop  Continue lisinopril  Work on [employer] with a program; let me know if you want me to see the medically managed weight loss folk  Labs in [employer]  Ultrasound in march Hollis Mcmahon  Gastroenterology  [address] [place] [address]  Phone: [phone]  Fax: ()-  Established Patient  Follow Up Time: Routine

## 2023-10-21 DIAGNOSIS — F41.9 ANXIETY: ICD-10-CM

## 2023-10-23 RX ORDER — ESCITALOPRAM OXALATE 10 MG/1
TABLET ORAL
Qty: 30 TABLET | Refills: 0 | Status: SHIPPED | OUTPATIENT
Start: 2023-10-23

## 2023-10-31 ENCOUNTER — OFFICE VISIT (OUTPATIENT)
Dept: FAMILY MEDICINE CLINIC | Facility: CLINIC | Age: 48
End: 2023-10-31
Payer: COMMERCIAL

## 2023-10-31 VITALS
HEART RATE: 88 BPM | RESPIRATION RATE: 16 BRPM | TEMPERATURE: 97.5 F | SYSTOLIC BLOOD PRESSURE: 126 MMHG | OXYGEN SATURATION: 97 % | BODY MASS INDEX: 34.49 KG/M2 | WEIGHT: 207 LBS | HEIGHT: 65 IN | DIASTOLIC BLOOD PRESSURE: 82 MMHG

## 2023-10-31 DIAGNOSIS — I10 ESSENTIAL HYPERTENSION: Primary | ICD-10-CM

## 2023-10-31 DIAGNOSIS — Z23 ENCOUNTER FOR IMMUNIZATION: ICD-10-CM

## 2023-10-31 DIAGNOSIS — Z12.31 OTHER SCREENING MAMMOGRAM: ICD-10-CM

## 2023-10-31 PROCEDURE — 90471 IMMUNIZATION ADMIN: CPT

## 2023-10-31 PROCEDURE — 90686 IIV4 VACC NO PRSV 0.5 ML IM: CPT

## 2023-10-31 PROCEDURE — 99213 OFFICE O/P EST LOW 20 MIN: CPT | Performed by: FAMILY MEDICINE

## 2023-10-31 RX ORDER — RIMEGEPANT SULFATE 75 MG/75MG
75 TABLET, ORALLY DISINTEGRATING ORAL DAILY PRN
COMMUNITY
Start: 2023-09-08

## 2023-10-31 NOTE — PROGRESS NOTES
Name: Black Dawn      : 1975      MRN: [id number]  Encounter Provider: Yusuf Sheikh DO  Encounter Date: 10/31/2023   Encounter department: [facility]     1. Essential hypertension  Comments:  well-controlled on current meds  continue same  Orders:  -     CBC and differential; Future; Expected date: 2024  -     Comprehensive metabolic panel; Future; Expected date: 2024  -     Lipid panel; Future; Expected date: 2024  -     TSH, 3rd generation; Future; Expected date: 2024    2. Encounter for immunization  -     influenza vaccine, quadrivalent, 0.5 mL, preservative-free, for adult and pediatric patients 6 mos+ (AFLURIA, FLUARIX, FLULAVAL, FLUZONE)    3. Other screening mammogram  -     Mammo screening bilateral w 3d & cad        Depression Screening and Follow-up Plan: Patient was screened for depression during today's encounter. They screened negative with a PHQ-2 score of 0. Subjective      HPI  Pt presents in f/u for HTN. Tolerating meds. No chest pain, shortness of breath, n/v, abd pain, visual changes, paresthesias, weakness.     Review of Systems  See hpi    Current Outpatient Medications on File Prior to Visit   Medication Sig    cyclobenzaprine (FLEXERIL) 10 mg tablet Take 1 tablet (10 mg total) by mouth 3 (three) times a day as needed for muscle spasms    escitalopram (LEXAPRO) 10 mg tablet take 1 tablet by mouth once daily    lisinopril (ZESTRIL) 40 mg tablet Take 1 tablet (40 mg total) by mouth daily    multivitamin (THERAGRAN) TABS Take 1 tablet by mouth daily    naproxen (Naprosyn) 500 mg tablet Take 1 tablet (500 mg total) by mouth 2 (two) times a day with meals (Patient taking differently: Take 500 mg by mouth 2 (two) times a day as needed)    Nurtec 75 MG TBDP Take 75 mg by mouth daily as needed    Omega-3 Fatty Acids (FISH OIL OMEGA-3) 1000 MG CAPS Take 2 g by mouth daily    Probiotic Product (PROBIOTIC-10) CAPS Take 1 capsule by mouth daily     [DISCONTINUED] polyethylene glycol (Golytely) 4000 mL solution Take 4,000 mL by mouth once for 1 dose Take 4000 mL by mouth once for 1 dose. Use as directed       Objective     /82   Pulse 88   Temp 97.5 °F (36.4 °C) (Temporal)   Resp 16   Ht 5' 5" (1.651 m)   Wt 93.9 kg (207 lb)   SpO2 97%   BMI 34.45 kg/m²     Physical Exam  Constitutional:       Appearance: Normal appearance. HENT:      Head: Normocephalic and atraumatic. Eyes:      Conjunctiva/sclera: Conjunctivae normal.   Cardiovascular:      Rate and Rhythm: Normal rate and regular rhythm. Heart sounds: No murmur heard. No gallop. Pulmonary:      Effort: Pulmonary effort is normal.      Breath sounds: No wheezing, rhonchi or rales. Neurological:      General: No focal deficit present. Mental Status: She is alert and oriented to person, place, and time.        Machelle Nicole, DO

## 2023-11-23 DIAGNOSIS — I10 ESSENTIAL HYPERTENSION: ICD-10-CM

## 2023-11-23 DIAGNOSIS — F41.9 ANXIETY: ICD-10-CM

## 2023-11-24 RX ORDER — LISINOPRIL 40 MG/1
40 TABLET ORAL DAILY
Qty: 30 TABLET | Refills: 3 | Status: SHIPPED | OUTPATIENT
Start: 2023-11-24

## 2023-11-24 RX ORDER — ESCITALOPRAM OXALATE 10 MG/1
TABLET ORAL
Qty: 30 TABLET | Refills: 0 | Status: SHIPPED | OUTPATIENT
Start: 2023-11-24

## 2023-12-14 ENCOUNTER — ANNUAL EXAM (OUTPATIENT)
Dept: OBGYN CLINIC | Facility: CLINIC | Age: 48
End: 2023-12-14
Payer: COMMERCIAL

## 2023-12-14 VITALS
BODY MASS INDEX: 34.16 KG/M2 | DIASTOLIC BLOOD PRESSURE: 74 MMHG | HEIGHT: 65 IN | WEIGHT: 205 LBS | SYSTOLIC BLOOD PRESSURE: 130 MMHG

## 2023-12-14 DIAGNOSIS — N92.0 MENORRHAGIA WITH REGULAR CYCLE: Primary | ICD-10-CM

## 2023-12-14 DIAGNOSIS — Z12.39 ENCOUNTER FOR SCREENING BREAST EXAMINATION: ICD-10-CM

## 2023-12-14 DIAGNOSIS — Z01.419 ENCOUNTER FOR WELL WOMAN EXAM: Primary | ICD-10-CM

## 2023-12-14 DIAGNOSIS — N92.0 MENORRHAGIA WITH REGULAR CYCLE: ICD-10-CM

## 2023-12-14 PROCEDURE — S0612 ANNUAL GYNECOLOGICAL EXAMINA: HCPCS | Performed by: PHYSICIAN ASSISTANT

## 2023-12-14 RX ORDER — TRANEXAMIC ACID 650 MG/1
650 TABLET ORAL 3 TIMES DAILY
Qty: 30 TABLET | Refills: 5 | Status: SHIPPED | OUTPATIENT
Start: 2023-12-14

## 2023-12-14 NOTE — PROGRESS NOTES
Assessment/Plan:    No problem-specific Assessment & Plan notes found for this encounter. Diagnoses and all orders for this visit:    Encounter for well woman exam    Encounter for screening breast examination    Menorrhagia with regular cycle  -     Tranexamic Acid 650 MG TABS; Take 1 tablet (650 mg total) by mouth 3 (three) times a day Take 2 tabs TID x 5 days          Subjective:      Patient ID: Magdalena Vo is a 52 y.o. female. Pt presents for her annual exam today--  She has complaints of heavier cycles, some fatigue  She has regular, monthly bleeding still  No major pelvic pain  Occ cramping  +factor V  Bowel and bladder are regular  Colonoscopy--22  No breast concerns today  Last mammo--22    No pap today. Rx mammo  Daily mvi, fe, c  Try rx lysteda 650mg 2 tid x 5 days        The following portions of the patient's history were reviewed and updated as appropriate: allergies, current medications, past family history, past medical history, past social history, past surgical history, and problem list.    Review of Systems   Constitutional:  Negative for chills, fever and unexpected weight change. HENT:  Negative for ear pain and sore throat. Eyes:  Negative for pain and visual disturbance. Respiratory:  Negative for cough and shortness of breath. Cardiovascular:  Negative for chest pain and palpitations. Gastrointestinal:  Negative for abdominal pain, blood in stool, constipation, diarrhea and vomiting. Genitourinary:  Positive for menstrual problem. Negative for dysuria and hematuria. Musculoskeletal:  Negative for arthralgias and back pain. Skin:  Negative for color change and rash. Neurological:  Negative for seizures and syncope. All other systems reviewed and are negative. Objective:      /74   Ht 5' 5" (1.651 m)   Wt 93 kg (205 lb)   LMP 11/19/2023   BMI 34.11 kg/m²          Physical Exam  Vitals and nursing note reviewed.    Constitutional: Appearance: She is well-developed. HENT:      Head: Normocephalic and atraumatic. Chest:   Breasts:     Right: No inverted nipple, mass, nipple discharge or skin change. Left: No inverted nipple, mass, nipple discharge or skin change. Abdominal:      Palpations: Abdomen is soft. Genitourinary:     Exam position: Supine. Labia:         Right: No rash, tenderness or lesion. Left: No rash, tenderness or lesion. Vagina: Normal.      Cervix: No cervical motion tenderness, discharge or friability. Adnexa:         Right: No mass, tenderness or fullness. Left: No mass, tenderness or fullness. Musculoskeletal:      Cervical back: Normal range of motion. Lymphadenopathy:      Lower Body: No right inguinal adenopathy. No left inguinal adenopathy.

## 2023-12-14 NOTE — TELEPHONE ENCOUNTER
The patient's pharmacy called and said that they need you to resend the script for Tranexamic Acid with the correct instructions? There appears to be two different instructions on the script.

## 2023-12-20 RX ORDER — TRANEXAMIC ACID 650 MG/1
650 TABLET ORAL 2 TIMES DAILY
Qty: 10 TABLET | Refills: 0 | Status: SHIPPED | OUTPATIENT
Start: 2023-12-20 | End: 2023-12-25

## 2023-12-29 DIAGNOSIS — F41.9 ANXIETY: ICD-10-CM

## 2023-12-29 RX ORDER — ESCITALOPRAM OXALATE 10 MG/1
TABLET ORAL
Qty: 30 TABLET | Refills: 0 | Status: SHIPPED | OUTPATIENT
Start: 2023-12-29

## 2024-01-04 DIAGNOSIS — N92.0 MENORRHAGIA WITH REGULAR CYCLE: ICD-10-CM

## 2024-01-05 RX ORDER — TRANEXAMIC ACID 650 MG/1
TABLET ORAL
Qty: 10 TABLET | OUTPATIENT
Start: 2024-01-05

## 2024-01-25 ENCOUNTER — HOSPITAL ENCOUNTER (OUTPATIENT)
Dept: MAMMOGRAPHY | Facility: MEDICAL CENTER | Age: 49
Discharge: HOME | End: 2024-01-25
Payer: COMMERCIAL

## 2024-01-25 VITALS — WEIGHT: 205.03 LBS | BODY MASS INDEX: 34.16 KG/M2 | HEIGHT: 65 IN

## 2024-01-25 PROCEDURE — 77063 BREAST TOMOSYNTHESIS BI: CPT

## 2024-01-25 PROCEDURE — 77067 SCR MAMMO BI INCL CAD: CPT

## 2024-01-26 DIAGNOSIS — G43.009 MIGRAINE WITHOUT AURA AND WITHOUT STATUS MIGRAINOSUS, NOT INTRACTABLE: Primary | ICD-10-CM

## 2024-01-26 RX ORDER — RIMEGEPANT SULFATE 75 MG/75MG
75 TABLET, ORALLY DISINTEGRATING ORAL DAILY PRN
Qty: 8 TABLET | Refills: 0 | Status: SHIPPED | OUTPATIENT
Start: 2024-01-26 | End: 2024-01-31 | Stop reason: SDUPTHER

## 2024-01-26 NOTE — TELEPHONE ENCOUNTER
Reason for call:   [x] Refill   [] Prior Auth  [] Other:     Office:   [x] PCP/Provider - Dee Edmond  [] Specialty/Provider -     Medication: Nurtec TBDP    Dose/Frequency: 75 mg Daily PRN    Quantity: 8    Pharmacy: [facility]    Does the patient have enough for 3 days?   [] Yes   [] No - Send as HP to POD unknown pharmacy calling for refill    Pharmacist stated Dr. Edmond and needs updated frequency for new prescription

## 2024-01-31 RX ORDER — RIMEGEPANT SULFATE 75 MG/75MG
TABLET, ORALLY DISINTEGRATING ORAL
Qty: 8 TABLET | Refills: 3 | Status: SHIPPED | OUTPATIENT
Start: 2024-01-31

## 2024-01-31 NOTE — TELEPHONE ENCOUNTER
Patient called the RX Refill Line. Message is being forwarded to the office.     Patient is being told by [facility] that she needs a new prescription stating the MAX DAILY DOSE of Nurtec. Told pt I would relay message to office so the pharmacy can either get a verbal stating MDD or a new script sent it. Making HP because pt needs.    Please contact patient at [phone]

## 2024-02-20 DIAGNOSIS — F41.9 ANXIETY: ICD-10-CM

## 2024-02-21 RX ORDER — ESCITALOPRAM OXALATE 10 MG/1
10 TABLET ORAL DAILY
Qty: 30 TABLET | Refills: 5 | Status: SHIPPED | OUTPATIENT
Start: 2024-02-21

## 2024-03-07 NOTE — PATIENT INSTRUCTIONS
Urogyn at [facility] [phone]  Call for colonoscopy  Obtain labs  Nurtec for migraine as needed--let me know how this works  Schedule mammogram
show

## 2024-04-07 DIAGNOSIS — I10 ESSENTIAL HYPERTENSION: ICD-10-CM

## 2024-04-08 RX ORDER — LISINOPRIL 40 MG/1
40 TABLET ORAL DAILY
Qty: 30 TABLET | Refills: 5 | Status: SHIPPED | OUTPATIENT
Start: 2024-04-08

## 2024-05-25 DIAGNOSIS — G43.009 MIGRAINE WITHOUT AURA AND WITHOUT STATUS MIGRAINOSUS, NOT INTRACTABLE: ICD-10-CM

## 2024-05-28 RX ORDER — RIMEGEPANT SULFATE 75 MG/75MG
TABLET, ORALLY DISINTEGRATING ORAL
Qty: 8 TABLET | Refills: 0 | Status: SHIPPED | OUTPATIENT
Start: 2024-05-28

## 2024-07-23 LAB
ALBUMIN SERPL-MCNC: 4.3 G/DL (ref 3.6–5.1)
ALBUMIN/GLOB SERPL: 1.7 (CALC) (ref 1–2.5)
ALP SERPL-CCNC: 43 U/L (ref 31–125)
ALT SERPL-CCNC: 32 U/L (ref 6–29)
AST SERPL-CCNC: 25 U/L (ref 10–35)
BASOPHILS # BLD AUTO: 11 CELLS/UL (ref 0–200)
BASOPHILS NFR BLD AUTO: 0.2 %
BILIRUB SERPL-MCNC: 0.4 MG/DL (ref 0.2–1.2)
BUN SERPL-MCNC: 15 MG/DL (ref 7–25)
BUN/CREAT SERPL: 15 (CALC) (ref 6–22)
CALCIUM SERPL-MCNC: 9.3 MG/DL (ref 8.6–10.2)
CHLORIDE SERPL-SCNC: 103 MMOL/L (ref 98–110)
CHOLEST SERPL-MCNC: 195 MG/DL
CHOLEST/HDLC SERPL: 6.1 (CALC)
CO2 SERPL-SCNC: 30 MMOL/L (ref 20–32)
CREAT SERPL-MCNC: 1 MG/DL (ref 0.5–0.99)
EOSINOPHIL # BLD AUTO: 138 CELLS/UL (ref 15–500)
EOSINOPHIL NFR BLD AUTO: 2.6 %
ERYTHROCYTE [DISTWIDTH] IN BLOOD BY AUTOMATED COUNT: 12.5 % (ref 11–15)
GFR/BSA.PRED SERPLBLD CYS-BASED-ARV: 69 ML/MIN/1.73M2
GLOBULIN SER CALC-MCNC: 2.6 G/DL (CALC) (ref 1.9–3.7)
GLUCOSE SERPL-MCNC: 119 MG/DL (ref 65–99)
HCT VFR BLD AUTO: 40.6 % (ref 35–45)
HDLC SERPL-MCNC: 32 MG/DL
HGB BLD-MCNC: 13.2 G/DL (ref 11.7–15.5)
LDLC SERPL CALC-MCNC: 122 MG/DL (CALC)
LYMPHOCYTES # BLD AUTO: 2014 CELLS/UL (ref 850–3900)
LYMPHOCYTES NFR BLD AUTO: 38 %
MCH RBC QN AUTO: 28.3 PG (ref 27–33)
MCHC RBC AUTO-ENTMCNC: 32.5 G/DL (ref 32–36)
MCV RBC AUTO: 87.1 FL (ref 80–100)
MONOCYTES # BLD AUTO: 461 CELLS/UL (ref 200–950)
MONOCYTES NFR BLD AUTO: 8.7 %
NEUTROPHILS # BLD AUTO: 2677 CELLS/UL (ref 1500–7800)
NEUTROPHILS NFR BLD AUTO: 50.5 %
NONHDLC SERPL-MCNC: 163 MG/DL (CALC)
PLATELET # BLD AUTO: 206 THOUSAND/UL (ref 140–400)
PMV BLD REES-ECKER: 12.5 FL (ref 7.5–12.5)
POTASSIUM SERPL-SCNC: 4 MMOL/L (ref 3.5–5.3)
PROT SERPL-MCNC: 6.9 G/DL (ref 6.1–8.1)
RBC # BLD AUTO: 4.66 MILLION/UL (ref 3.8–5.1)
SODIUM SERPL-SCNC: 140 MMOL/L (ref 135–146)
TRIGL SERPL-MCNC: 265 MG/DL
TSH SERPL-ACNC: 1.72 MIU/L
WBC # BLD AUTO: 5.3 THOUSAND/UL (ref 3.8–10.8)

## 2024-07-24 DIAGNOSIS — R73.9 ELEVATED BLOOD SUGAR: Primary | ICD-10-CM

## 2024-08-22 DIAGNOSIS — N95.1 PERIMENOPAUSE: Primary | ICD-10-CM

## 2024-09-08 DIAGNOSIS — G43.009 MIGRAINE WITHOUT AURA AND WITHOUT STATUS MIGRAINOSUS, NOT INTRACTABLE: ICD-10-CM

## 2024-09-10 RX ORDER — RIMEGEPANT SULFATE 75 MG/75MG
TABLET, ORALLY DISINTEGRATING ORAL
Qty: 8 TABLET | Refills: 0 | Status: SHIPPED | OUTPATIENT
Start: 2024-09-10

## 2024-09-24 NOTE — PROGRESS NOTES
Assessment/Plan:    Discussed menopause vs perimenopause vs hormonal fluctuations in late 40s.   Discussed that estrogen cannot be added secondary to Factor V which pt is aware of. Discussed a trial of Nexplanon to try to prevent significant hormonal fluctuations and decrease menorrhagia which may help fatigue and brain fog. Pt would like to try. Insurance information given and pt will call to get Nexplanon insertion scheduled. Risks/benefits/insertion procedure reviewed at length.     I have spent a total time of 32 minutes in caring for this patient on the day of the visit/encounter including Diagnostic results, Prognosis, Risks and benefits of tx options, Instructions for management, Patient and family education, Importance of tx compliance, Risk factor reductions, Impressions, Counseling / Coordination of care, Documenting in the medical record, Reviewing / ordering tests, medicine, procedures  , and Obtaining or reviewing history  .      Diagnoses and all orders for this visit:    Perimenopause  -     Ambulatory Referral to Obstetrics / Gynecology    Screening mammogram for breast cancer  -     Mammo screening bilateral w 3d and cad; Future    Factor 5 Leiden mutation, heterozygous (HCC)        Subjective:      Patient ID: Cassy Jacob is a 48 y.o. female.    New pt presents for perimenopause consult. Referred by PCP.   TSH normal 7/23/24.  Normal mammo 1/2024.  Hx of migraine without aura. Hx of Factor V  Pt continues to menstruate regularly and has not missed any months. She reports very heavy periods and has been treated with TXA in the past. She has tried DMPA in the past but did not like the emotional SE.   Pt's most bothersome sxs are fatigue and brain fog. Pt feels sx are worse before and during menses, but are present all month long.   She is sexually active.  has had a vasectomy.         The following portions of the patient's history were reviewed and updated as appropriate: allergies, current  "medications, past family history, past medical history, past social history, past surgical history and problem list.    Review of Systems   Constitutional:  Positive for fatigue.   HENT: Negative.     Respiratory: Negative.     Cardiovascular: Negative.    Gastrointestinal: Negative.    Endocrine: Negative.    Genitourinary:  Positive for menstrual problem. Negative for difficulty urinating, dyspareunia, dysuria, frequency, pelvic pain, urgency, vaginal bleeding, vaginal discharge and vaginal pain.   Musculoskeletal: Negative.    Skin: Negative.    Neurological: Negative.    Psychiatric/Behavioral:  Positive for decreased concentration.          Objective:      /63 (BP Location: Left arm, Patient Position: Sitting, Cuff Size: Large)   Ht 5' 5\" (1.651 m)   Wt 93 kg (205 lb)   LMP 09/04/2024 (Approximate)   BMI 34.11 kg/m²          Physical Exam  Vitals and nursing note reviewed.   Constitutional:       Appearance: Normal appearance.   HENT:      Head: Normocephalic and atraumatic.   Pulmonary:      Effort: Pulmonary effort is normal.   Musculoskeletal:         General: Normal range of motion.      Cervical back: Normal range of motion.   Skin:     General: Skin is warm and dry.   Neurological:      Mental Status: She is alert and oriented to person, place, and time.   Psychiatric:         Mood and Affect: Mood normal.         Behavior: Behavior normal.         Thought Content: Thought content normal.         Judgment: Judgment normal.         "

## 2024-09-25 ENCOUNTER — OFFICE VISIT (OUTPATIENT)
Dept: GYNECOLOGY | Facility: CLINIC | Age: 49
End: 2024-09-25
Payer: COMMERCIAL

## 2024-09-25 VITALS
HEIGHT: 65 IN | SYSTOLIC BLOOD PRESSURE: 110 MMHG | DIASTOLIC BLOOD PRESSURE: 63 MMHG | WEIGHT: 205 LBS | BODY MASS INDEX: 34.16 KG/M2

## 2024-09-25 DIAGNOSIS — Z12.31 SCREENING MAMMOGRAM FOR BREAST CANCER: ICD-10-CM

## 2024-09-25 DIAGNOSIS — D68.51 FACTOR 5 LEIDEN MUTATION, HETEROZYGOUS (HCC): ICD-10-CM

## 2024-09-25 DIAGNOSIS — N95.1 PERIMENOPAUSE: Primary | ICD-10-CM

## 2024-09-25 PROCEDURE — 99214 OFFICE O/P EST MOD 30 MIN: CPT | Performed by: OBSTETRICS & GYNECOLOGY

## 2024-09-30 ENCOUNTER — OFFICE VISIT (OUTPATIENT)
Dept: URGENT CARE | Facility: CLINIC | Age: 49
End: 2024-09-30
Payer: COMMERCIAL

## 2024-09-30 VITALS
TEMPERATURE: 96.7 F | HEART RATE: 82 BPM | RESPIRATION RATE: 18 BRPM | OXYGEN SATURATION: 97 % | DIASTOLIC BLOOD PRESSURE: 70 MMHG | SYSTOLIC BLOOD PRESSURE: 138 MMHG

## 2024-09-30 DIAGNOSIS — Z20.822 CLOSE EXPOSURE TO COVID-19 VIRUS: Primary | ICD-10-CM

## 2024-09-30 DIAGNOSIS — B34.9 VIRAL INFECTION: ICD-10-CM

## 2024-09-30 LAB
SARS-COV-2 AG UPPER RESP QL IA: NEGATIVE
VALID CONTROL: NORMAL

## 2024-09-30 PROCEDURE — 87811 SARS-COV-2 COVID19 W/OPTIC: CPT | Performed by: NURSE PRACTITIONER

## 2024-09-30 PROCEDURE — 99213 OFFICE O/P EST LOW 20 MIN: CPT | Performed by: NURSE PRACTITIONER

## 2024-09-30 RX ORDER — PREDNISONE 20 MG/1
20 TABLET ORAL 2 TIMES DAILY WITH MEALS
Qty: 10 TABLET | Refills: 0 | Status: SHIPPED | OUTPATIENT
Start: 2024-09-30 | End: 2024-10-05

## 2024-09-30 RX ORDER — BENZONATATE 200 MG/1
200 CAPSULE ORAL 3 TIMES DAILY PRN
Qty: 20 CAPSULE | Refills: 0 | Status: SHIPPED | OUTPATIENT
Start: 2024-09-30

## 2024-09-30 NOTE — PATIENT INSTRUCTIONS
"Patient Education     COVID-19 in adults - Discharge instructions   The Basics   Written by the doctors and editors at [facility]   What are discharge instructions? -- Discharge instructions are information about how to take care of yourself after getting medical care for a health problem.  What is COVID-19? -- COVID-19 stands for \"coronavirus disease 2019.\" It is caused by a virus called SARS-CoV-2.  The virus that causes COVID-19 mainly spreads from person to person. This usually happens when an infected person coughs, sneezes, or talks near other people. A person can be infected, and spread the virus to others, even without having any symptoms.  How do I care for myself at home? -- Ask the doctor or nurse what you should do when you go home. Make sure that you understand exactly what you need to do to care for yourself. Ask questions if there is anything you do not understand.  You can also:   Follow all instructions for taking your medicines, if your doctor prescribed any.   Drink plenty of fluids, such as water, juice, or broth. This helps replace fluids lost from a fever.   Take acetaminophen (sample brand name: Tylenol) to help reduce a fever. If this does not help, you can try medicines like ibuprofen (sample brand names: Advil, Motrin).   Use a cool mist humidifier. This might make it easier to breathe.   Lower the chance of passing the infection to others:   Stay home while you are feeling sick or have a fever.   At home, try to limit close contact with other people. You can also help protect others by wearing a face mask.   Wash your hands often (figure 1).   Cover your mouth and nose with the inside of your elbow when you cough or sneeze.   Do not go to work or school until your symptoms are improving and your fever has been gone for at least 24 hours without taking medicine such as acetaminophen.  If you have not already been vaccinated, consider getting a COVID-19 vaccine as soon as you have recovered. " Being vaccinated is the best way to protect yourself and others.  When should I call the doctor? -- Call for an ambulance (in the [place] and [place], call 9-1-1) if:   You are having so much trouble breathing that you cannot speak a full sentence.   You are very confused or cannot stay awake.   Your lips or skin start to turn blue.   You think that you might be having a medical emergency - Examples include severe chest pain, feeling extremely weak or like you might pass out, or losing control of your body (like being unable to speak normally or move your arm or leg).  Call your doctor if:   You develop new shortness of breath, or your breathing gets worse (but you can still talk in full sentences).   You become weak or dizzy.   You have very dark urine or do not urinate for more than 8 hours.   You have new or worsening symptoms that concern you - COVID-19 symptoms can include fever, cough, feeling very tired, shaking, chills, headache, and trouble swallowing. They can also include digestive problems like vomiting or diarrhea.  All topics are updated as new evidence becomes available and our peer review process is complete.  This topic retrieved from [employer] on: Mar 13, 2024.  Topic 211816 Version 2.0  Release: 32.2.4 - C32.71  © 2024 UpToDate, Inc. and/or its affiliates. All rights reserved.  figure 1: How to wash your hands     Wet your hands with clean water, and apply a small amount of soap. Lather and rub hands together for at least 20 seconds. Clean your wrists, palms, backs of your hands, between your fingers, tips of your fingers, thumbs, and under and around your nails. Rinse well, and dry your hands using a clean towel.  Graphic 855018 Version 7.0  Consumer Information Use and Disclaimer   Disclaimer: This generalized information is a limited summary of diagnosis, treatment, and/or medication information. It is not meant to be comprehensive and should be used as a tool to help the user understand and/or assess  potential diagnostic and treatment options. It does NOT include all information about conditions, treatments, medications, side effects, or risks that may apply to a specific patient. It is not intended to be medical advice or a substitute for the medical advice, diagnosis, or treatment of a health care provider based on the health care provider's examination and assessment of a patient's specific and unique circumstances. Patients must speak with a health care provider for complete information about their health, medical questions, and treatment options, including any risks or benefits regarding use of medications. This information does not endorse any treatments or medications as safe, effective, or approved for treating a specific patient. UpToDate, Inc. and its affiliates disclaim any warranty or liability relating to this information or the use thereof.The use of this information is governed by the Terms of Use, available at https://www.Blastbeatuwer.com/en/know/clinical-effectiveness-terms. 2024© UpToDate, Inc. and its affiliates and/or licensors. All rights reserved.  Copyright   © 2024 UpToDate, Inc. and/or its affiliates. All rights reserved.

## 2024-09-30 NOTE — LETTER
September 30, 2024     Patient: Cassy Jacob   YOB: 1975   Date of Visit: 9/30/2024       To Whom It May Concern:    It is my medical opinion that Cassy Jacob may return to work on 10/2/2024 .    If you have any questions or concerns, please don't hesitate to call.         Sincerely,        GARRETT ROWELL NOW    CC: No Recipients

## 2024-09-30 NOTE — PROGRESS NOTES
[facility]        NAME: Cassy Jacob is a 48 y.o. female  : 1975    MRN: [id number]  DATE: 2024  TIME: 6:08 PM    Assessment and Plan   Close exposure to COVID-19 virus [Z20.822]  1. Close exposure to COVID-19 virus  Poct Covid 19 Rapid Antigen Test      2. Viral infection  predniSONE 20 mg tablet    benzonatate (TESSALON) 200 MG capsule        Rapid COVID in office is negative.  Discussed most likely had been COVID however it is currently day 6 of symptoms and no longer testing positive with rapid test.  Will trial prednisone and Tessalon for symptom management.  Follow-up PCP.  Patient agreement with plan.    Patient Instructions     Follow up with PCP in 3-5 days.  Proceed to  ER if symptoms worsen.    Chief Complaint     Chief Complaint   Patient presents with    Cold Like Symptoms     Patient states that she has been sick since lat Tuesday. Head congestion, cough, hoarse voice, fatigue.  States that  was here this am and tested positive for COVID and she wants a test. She tested last week and was negative         History of Present Illness   Cassy Jacob presents to the clinic c/o    Cold Like Symptoms (Patient states that she has been sick since lat Tuesday. Head congestion, cough, hoarse voice, fatigue.  States that  was here this am and tested positive for COVID and she wants a test. She tested last week and was negative)  Patient states she wants make sure she does not have a sinus infection.  She did not do a COVID test.  Her symptoms have been ongoing she is currently day 6 of symptoms.  Has been most of started on Paxlovid.        Review of Systems   Review of Systems   All other systems reviewed and are negative.        Current Medications     Long-Term Medications   Medication Sig Dispense Refill    escitalopram (LEXAPRO) 10 mg tablet Take 1 tablet (10 mg total) by mouth daily 30 tablet 5    lisinopril (ZESTRIL) 40 mg tablet take 1 tablet by mouth once daily  30 tablet 5    multivitamin (THERAGRAN) TABS Take 1 tablet by mouth daily      naproxen (Naprosyn) 500 mg tablet Take 1 tablet (500 mg total) by mouth 2 (two) times a day with meals (Patient taking differently: Take 500 mg by mouth 2 (two) times a day as needed) 60 tablet 0    [DISCONTINUED] polyethylene glycol (Golytely) 4000 mL solution Take 4,000 mL by mouth once for 1 dose Take 4000 mL by mouth once for 1 dose. Use as directed 4000 mL 0       Current Allergies     Allergies as of 09/30/2024 - Reviewed 09/30/2024   Allergen Reaction Noted    Halsey oil - food allergy Itching, Tongue Swelling, and Facial Swelling 12/10/2015    Juniata (diagnostic) - food allergy Hives and Itching 10/31/2023    Soybean oil - food allergy Itching 10/31/2023    Latex Hives, Itching, and Rash 12/10/2015            The following portions of the patient's history were reviewed and updated as appropriate: allergies, current medications, past family history, past medical history, past social history, past surgical history and problem list.    Objective   /70   Pulse 82   Temp (!) 96.7 °F (35.9 °C) (Tympanic)   Resp 18   LMP 09/04/2024 (Approximate)   SpO2 97%        Physical Exam     Physical Exam  Vitals and nursing note reviewed.   Constitutional:       Appearance: Normal appearance. She is well-developed.   HENT:      Head: Normocephalic and atraumatic.      Right Ear: Hearing, tympanic membrane, ear canal and external ear normal.      Left Ear: Hearing, tympanic membrane, ear canal and external ear normal.      Nose: Mucosal edema and congestion present.      Right Sinus: Maxillary sinus tenderness and frontal sinus tenderness present.      Left Sinus: Maxillary sinus tenderness and frontal sinus tenderness present.      Mouth/Throat:      Lips: Pink.      Mouth: Mucous membranes are moist.      Pharynx: Oropharynx is clear.      Tonsils: No tonsillar exudate.   Eyes:      General: Lids are normal.      Conjunctiva/sclera:  Conjunctivae normal.      Pupils: Pupils are equal, round, and reactive to light.   Cardiovascular:      Rate and Rhythm: Normal rate and regular rhythm.      Heart sounds: Normal heart sounds, S1 normal and S2 normal.   Pulmonary:      Effort: Pulmonary effort is normal.      Breath sounds: Normal breath sounds.   Abdominal:      General: Abdomen is flat. Bowel sounds are normal.      Palpations: Abdomen is soft.   Musculoskeletal:         General: Normal range of motion.      Cervical back: Full passive range of motion without pain, normal range of motion and neck supple.   Skin:     General: Skin is warm and dry.   Neurological:      General: No focal deficit present.      Mental Status: She is alert and oriented to person, place, and time.   Psychiatric:         Mood and Affect: Mood normal.         Speech: Speech normal.         Behavior: Behavior normal. Behavior is cooperative.         Thought Content: Thought content normal.         Judgment: Judgment normal.

## 2024-10-16 DIAGNOSIS — I10 ESSENTIAL HYPERTENSION: ICD-10-CM

## 2024-10-16 NOTE — TELEPHONE ENCOUNTER
Medication: lisinopril (ZESTRIL) 40 mg tablet     Dose/Frequency: take 1 tablet by mouth once daily     Quantity: 30 tab    Pharmacy: [id number] - [place] - [address]     Office:   [x] PCP/Provider -   [] Speciality/Provider -     Does the patient have enough for 3 days?   [] Yes   [x] No - Send as HP to POD    Pt has upcoming appt.

## 2024-10-18 RX ORDER — LISINOPRIL 40 MG/1
40 TABLET ORAL DAILY
Qty: 30 TABLET | Refills: 5 | Status: SHIPPED | OUTPATIENT
Start: 2024-10-18

## 2024-10-22 ENCOUNTER — OFFICE VISIT (OUTPATIENT)
Dept: FAMILY MEDICINE CLINIC | Facility: CLINIC | Age: 49
End: 2024-10-22
Payer: COMMERCIAL

## 2024-10-22 VITALS
RESPIRATION RATE: 16 BRPM | TEMPERATURE: 97.5 F | WEIGHT: 212 LBS | DIASTOLIC BLOOD PRESSURE: 84 MMHG | HEIGHT: 65 IN | OXYGEN SATURATION: 95 % | HEART RATE: 99 BPM | SYSTOLIC BLOOD PRESSURE: 128 MMHG | BODY MASS INDEX: 35.32 KG/M2

## 2024-10-22 DIAGNOSIS — R73.01 IFG (IMPAIRED FASTING GLUCOSE): ICD-10-CM

## 2024-10-22 DIAGNOSIS — F41.9 ANXIETY: Primary | ICD-10-CM

## 2024-10-22 DIAGNOSIS — I10 ESSENTIAL HYPERTENSION: ICD-10-CM

## 2024-10-22 DIAGNOSIS — E78.2 MIXED HYPERLIPIDEMIA: ICD-10-CM

## 2024-10-22 DIAGNOSIS — G43.009 MIGRAINE WITHOUT AURA AND WITHOUT STATUS MIGRAINOSUS, NOT INTRACTABLE: ICD-10-CM

## 2024-10-22 DIAGNOSIS — Z23 ENCOUNTER FOR IMMUNIZATION: ICD-10-CM

## 2024-10-22 PROCEDURE — 99214 OFFICE O/P EST MOD 30 MIN: CPT | Performed by: FAMILY MEDICINE

## 2024-10-22 PROCEDURE — 90471 IMMUNIZATION ADMIN: CPT | Performed by: FAMILY MEDICINE

## 2024-10-22 PROCEDURE — 90656 IIV3 VACC NO PRSV 0.5 ML IM: CPT | Performed by: FAMILY MEDICINE

## 2024-10-22 NOTE — PATIENT INSTRUCTIONS
Send me work labs  Check bps in the world--they should be running <140/90  Continue current meds  F/u 6 mo

## 2024-10-22 NOTE — PROGRESS NOTES
Ambulatory Visit  Name: Cassy Jacob      : 1975      MRN: [id number]  Encounter Provider: Joy Edmond DO  Encounter Date: 10/22/2024   Encounter department: [facility]    Assessment & Plan  Anxiety  Well-controlled off lexapro at this time  Continue plan for f/u with menopause specialty       Mixed hyperlipidemia  Pt getting lipids through work  I have asked her to send me a copy  Orders:    Lipid panel; Future    IFG (impaired fasting glucose)  Getting labs through work  I have asked her to send me a copy  If labs do not include A1c, will send her for same  Healthy diet and exercise    Orders:    Comprehensive metabolic panel; Future    Hemoglobin A1C; Future    Essential hypertension  At goal on current meds  Continue same  F/u 6 mo       Encounter for immunization    Orders:    influenza vaccine preservative-free 0.5 mL IM (Fluzone, Afluria, Fluarix, Flulaval)    Migraine without aura and without status migrainosus, not intractable  Well-controlled with current meds  No need for prophy right now            History of Present Illness     HPI  Pt presents for routine f/u.    Tolerating lisinopril.  Blood pressure appropriate.  No chest pain, shortness of breath, n/v, abd pain, visual changes, paresthesias, weakness, cough.    Has upcoming lipid, A1c, cmp through work.  Last lipids showed low hdl/high trigs.  Also has elevated fasting glucose.    Mood is well-controlled.  She is off lexapro and doing well.  Looking for hormone therapy for menopausal sx    Tolerating nurtec prn migraines.  This is effective when she uses it which is not more than 2x per week.        Review of Systems   Constitutional:  Negative for chills, fatigue, fever and unexpected weight change.   HENT:  Negative for congestion, ear pain, hearing loss, postnasal drip, rhinorrhea, sinus pressure, sinus pain, sore throat, trouble swallowing and voice change.    Eyes:  Negative for pain, redness and visual  "disturbance.   Respiratory:  Negative for cough and shortness of breath.    Cardiovascular:  Negative for chest pain, palpitations and leg swelling.   Gastrointestinal:  Negative for abdominal pain, constipation, diarrhea and nausea.   Endocrine: Negative for cold intolerance, heat intolerance, polydipsia and polyuria.   Genitourinary:  Negative for dysuria, frequency and urgency.   Musculoskeletal:  Negative for arthralgias, joint swelling and myalgias.   Skin:  Negative for rash.        No suspicious lesions   Neurological:  Negative for weakness, numbness and headaches.   Hematological:  Negative for adenopathy.           Objective     /84   Pulse 99   Temp 97.5 °F (36.4 °C) (Tympanic)   Resp 16   Ht 5' 5\" (1.651 m)   Wt 96.2 kg (212 lb)   LMP 09/04/2024 (Approximate)   SpO2 95%   BMI 35.28 kg/m²     Physical Exam  Constitutional:       Appearance: Normal appearance.   HENT:      Head: Normocephalic and atraumatic.      Right Ear: Tympanic membrane, ear canal and external ear normal.      Left Ear: Tympanic membrane, ear canal and external ear normal.      Nose: Nose normal. No congestion.      Mouth/Throat:      Mouth: Mucous membranes are moist.      Pharynx: No oropharyngeal exudate or posterior oropharyngeal erythema.   Eyes:      Extraocular Movements: Extraocular movements intact.      Conjunctiva/sclera: Conjunctivae normal.      Pupils: Pupils are equal, round, and reactive to light.   Neck:      Vascular: No carotid bruit.   Cardiovascular:      Rate and Rhythm: Normal rate and regular rhythm.      Heart sounds: No murmur heard.     No friction rub. No gallop.   Pulmonary:      Effort: Pulmonary effort is normal.      Breath sounds: No wheezing, rhonchi or rales.   Abdominal:      General: Abdomen is flat. There is no distension.      Palpations: Abdomen is soft.      Tenderness: There is no abdominal tenderness.   Musculoskeletal:      Cervical back: Neck supple.   Lymphadenopathy:      " Cervical: No cervical adenopathy.   Neurological:      General: No focal deficit present.      Mental Status: She is alert and oriented to person, place, and time.      Cranial Nerves: No cranial nerve deficit.      Motor: No weakness.      Deep Tendon Reflexes: Reflexes normal.

## 2024-10-22 NOTE — ASSESSMENT & PLAN NOTE
Pt getting lipids through work  I have asked her to send me a copy  Orders:    Lipid panel; Future

## 2024-10-30 ENCOUNTER — PROCEDURE VISIT (OUTPATIENT)
Dept: GYNECOLOGY | Facility: CLINIC | Age: 49
End: 2024-10-30
Payer: COMMERCIAL

## 2024-10-30 VITALS
SYSTOLIC BLOOD PRESSURE: 105 MMHG | HEIGHT: 65 IN | BODY MASS INDEX: 35.32 KG/M2 | WEIGHT: 212 LBS | DIASTOLIC BLOOD PRESSURE: 66 MMHG

## 2024-10-30 DIAGNOSIS — Z01.818 PRE-PROCEDURAL EXAMINATION: Primary | ICD-10-CM

## 2024-10-30 DIAGNOSIS — Z30.017 NEXPLANON INSERTION: ICD-10-CM

## 2024-10-30 LAB — SL AMB POCT URINE HCG: NORMAL

## 2024-10-30 PROCEDURE — 11981 INSERTION DRUG DLVR IMPLANT: CPT | Performed by: OBSTETRICS & GYNECOLOGY

## 2024-10-30 PROCEDURE — 81025 URINE PREGNANCY TEST: CPT | Performed by: OBSTETRICS & GYNECOLOGY

## 2024-10-30 NOTE — PROGRESS NOTES
"Universal Protocol:  procedure performed by consultantConsent: Verbal consent obtained. Written consent obtained.  Risks and benefits: risks, benefits and alternatives were discussed (infection, bleeding, pain, injury to surrounding structures, migration, breakage, difficulty with removal requiring surgery)  Time out: Immediately prior to procedure a \"time out\" was called to verify the correct patient, procedure, equipment, support staff and site/side marked as required.  Timeout called at: 10/30/2024 1:49 PM.  Patient understanding: patient states understanding of the procedure being performed  Patient consent: the patient's understanding of the procedure matches consent given  Procedure consent: procedure consent matches procedure scheduled  Relevant documents: relevant documents present and verified  Test results: test results available and properly labeled  Site marked: the operative site was marked  Required items: required blood products, implants, devices, and special equipment available  Patient identity confirmed: verbally with patient  Remove and insert drug implant    Date/Time: 10/30/2024 2:00 PM    Performed by: ELEAZAR Reza  Authorized by: ELEAZAR Reza    Indication:     Indication: Insertion of non-biodegradable drug delivery implant    Pre-procedure:     Pre-procedure timeout performed: yes      Prepped with: povidone-iodine      Local anesthetic:  Lidocaine with epinephrine    The site was cleaned and prepped in a sterile fashion: yes    Procedure:     Procedure:  Insertion    Left/right:  Left    Preloaded contraceptive capsule trocar was placed subdermally: yes      Visualization of implant was obtained: yes      Contraceptive capsule was inserted and trocar removed: yes      Visualization of notch in stylet and palpation of device: yes      Palpation confirms placement by provider and patient: yes      Site was closed with steri-strips and pressure bandage applied: yes  "   Comments:      Written post procedure instructions reviewed. Pt will RTO in 2 weeks for site check.

## 2024-11-24 DIAGNOSIS — I10 ESSENTIAL HYPERTENSION: ICD-10-CM

## 2024-11-26 RX ORDER — LISINOPRIL 40 MG/1
40 TABLET ORAL DAILY
Qty: 30 TABLET | Refills: 0 | OUTPATIENT
Start: 2024-11-26

## 2024-12-02 DIAGNOSIS — G43.009 MIGRAINE WITHOUT AURA AND WITHOUT STATUS MIGRAINOSUS, NOT INTRACTABLE: ICD-10-CM

## 2024-12-07 DIAGNOSIS — G43.009 MIGRAINE WITHOUT AURA AND WITHOUT STATUS MIGRAINOSUS, NOT INTRACTABLE: ICD-10-CM

## 2024-12-09 RX ORDER — RIMEGEPANT SULFATE 75 MG/75MG
TABLET, ORALLY DISINTEGRATING ORAL
Qty: 8 TABLET | Refills: 0 | Status: SHIPPED | OUTPATIENT
Start: 2024-12-09

## 2024-12-18 RX ORDER — RIMEGEPANT SULFATE 75 MG/75MG
TABLET, ORALLY DISINTEGRATING ORAL
Qty: 8 TABLET | Refills: 0 | Status: SHIPPED | OUTPATIENT
Start: 2024-12-18

## 2025-01-24 NOTE — PROGRESS NOTES
"Assessment/Plan:    Will continue Nexplanon. RTO for annual exam when that is due. Review White Syndrome family hx at that time.     Diagnoses and all orders for this visit:    PMS (premenstrual syndrome)        Subjective:      Patient ID: Cassy Jacob is a 49 y.o. female.    Pt presents to determine how she is doing on Nexplanon inserted 10/30/24.  She is trying this to avoid hormonal fluctuations of menstrual cycles and the sx she was experiencing with those cycles.  Today she states she is very pleased with symptom improvement. She feels \"calmer\" without other menopausal sx and would like to continue.         The following portions of the patient's history were reviewed and updated as appropriate: allergies, current medications, past family history, past medical history, past social history, past surgical history and problem list.    Review of Systems   Constitutional: Negative.    HENT: Negative.     Respiratory: Negative.     Cardiovascular: Negative.    Gastrointestinal: Negative.    Endocrine: Negative.    Genitourinary:  Negative for difficulty urinating, dyspareunia, dysuria, frequency, menstrual problem, pelvic pain, urgency, vaginal bleeding, vaginal discharge and vaginal pain.   Musculoskeletal: Negative.    Skin: Negative.    Neurological: Negative.    Psychiatric/Behavioral: Negative.           Objective:      /64 (BP Location: Left arm, Patient Position: Sitting, Cuff Size: Standard)   Ht 5' 5\" (1.651 m)   Wt 97.1 kg (214 lb)   BMI 35.61 kg/m²          Physical Exam  Vitals and nursing note reviewed.   Constitutional:       Appearance: Normal appearance.   HENT:      Head: Normocephalic and atraumatic.   Pulmonary:      Effort: Pulmonary effort is normal.   Musculoskeletal:         General: Normal range of motion.      Cervical back: Normal range of motion.      Comments: Nexplanon palpated without difficulty in left arm   Skin:     General: Skin is warm and dry.   Neurological:      Mental " Status: She is alert and oriented to person, place, and time.   Psychiatric:         Mood and Affect: Mood normal.         Behavior: Behavior normal.         Thought Content: Thought content normal.         Judgment: Judgment normal.

## 2025-01-27 ENCOUNTER — OFFICE VISIT (OUTPATIENT)
Dept: GYNECOLOGY | Facility: CLINIC | Age: 50
End: 2025-01-27
Payer: COMMERCIAL

## 2025-01-27 VITALS
DIASTOLIC BLOOD PRESSURE: 64 MMHG | BODY MASS INDEX: 35.65 KG/M2 | HEIGHT: 65 IN | WEIGHT: 214 LBS | SYSTOLIC BLOOD PRESSURE: 128 MMHG

## 2025-01-27 DIAGNOSIS — N94.3 PMS (PREMENSTRUAL SYNDROME): Primary | ICD-10-CM

## 2025-01-27 PROCEDURE — 99212 OFFICE O/P EST SF 10 MIN: CPT | Performed by: OBSTETRICS & GYNECOLOGY

## 2025-01-30 ENCOUNTER — VBI (OUTPATIENT)
Dept: ADMINISTRATIVE | Facility: OTHER | Age: 50
End: 2025-01-30

## 2025-01-30 NOTE — TELEPHONE ENCOUNTER
01/30/25 10:39 AM     Chart reviewed for Blood Pressure was/were submitted to the patient's insurance.     Mervat Thomas   [facility]

## 2025-01-31 ENCOUNTER — HOSPITAL ENCOUNTER (OUTPATIENT)
Dept: MAMMOGRAPHY | Facility: MEDICAL CENTER | Age: 50
Discharge: HOME | End: 2025-01-31
Payer: COMMERCIAL

## 2025-01-31 VITALS — BODY MASS INDEX: 35.65 KG/M2 | WEIGHT: 214 LBS | HEIGHT: 65 IN

## 2025-01-31 DIAGNOSIS — Z12.31 SCREENING MAMMOGRAM FOR BREAST CANCER: ICD-10-CM

## 2025-01-31 PROCEDURE — 77067 SCR MAMMO BI INCL CAD: CPT

## 2025-01-31 PROCEDURE — 77063 BREAST TOMOSYNTHESIS BI: CPT

## 2025-02-07 ENCOUNTER — OFFICE VISIT (OUTPATIENT)
Dept: URGENT CARE | Facility: CLINIC | Age: 50
End: 2025-02-07
Payer: COMMERCIAL

## 2025-02-07 ENCOUNTER — NURSE TRIAGE (OUTPATIENT)
Age: 50
End: 2025-02-07

## 2025-02-07 VITALS
WEIGHT: 214 LBS | OXYGEN SATURATION: 98 % | HEART RATE: 94 BPM | BODY MASS INDEX: 35.61 KG/M2 | TEMPERATURE: 98.6 F | RESPIRATION RATE: 16 BRPM | SYSTOLIC BLOOD PRESSURE: 128 MMHG | DIASTOLIC BLOOD PRESSURE: 84 MMHG

## 2025-02-07 DIAGNOSIS — J11.1 INFLUENZA-LIKE ILLNESS: Primary | ICD-10-CM

## 2025-02-07 PROCEDURE — 99213 OFFICE O/P EST LOW 20 MIN: CPT | Performed by: PHYSICIAN ASSISTANT

## 2025-02-07 RX ORDER — ALBUTEROL SULFATE 90 UG/1
2 INHALANT RESPIRATORY (INHALATION) EVERY 6 HOURS PRN
Qty: 18 G | Refills: 0 | Status: SHIPPED | OUTPATIENT
Start: 2025-02-07

## 2025-02-07 RX ORDER — PREDNISONE 20 MG/1
TABLET ORAL
Qty: 10 TABLET | Refills: 0 | Status: SHIPPED | OUTPATIENT
Start: 2025-02-07

## 2025-02-07 NOTE — TELEPHONE ENCOUNTER
If appropriate pt can check for flu and COVID and call us if positive   Offer appointment next week or she can go to urgent care

## 2025-02-07 NOTE — TELEPHONE ENCOUNTER
"Reason for Disposition   Coughing up demetrius-colored (reddish-brown) or blood-tinged sputum    Answer Assessment - Initial Assessment Questions  1. ONSET: \"When did the cough begin?\"       2/5  2. SEVERITY: \"How bad is the cough today?\"       Moderate  3. SPUTUM: \"Describe the color of your sputum\" (e.g., none, dry cough; clear, white, yellow, green)      Yes ,yellowish brown   4. HEMOPTYSIS: \"Are you coughing up any blood?\" If Yes, ask: \"How much?\" (e.g., flecks, streaks, tablespoons, etc.)      no  5. DIFFICULTY BREATHING: \"Are you having difficulty breathing?\" If Yes, ask: \"How bad is it?\" (e.g., mild, moderate, severe)      Patient stated she doesn't know   6. FEVER: \"Do you have a fever?\" If Yes, ask: \"What is your temperature, how was it measured, and when did it start?\"      Low grade off and on ,temp unknown   7. CARDIAC HISTORY: \"Do you have any history of heart disease?\" (e.g., heart attack, congestive heart failure)       No  8. LUNG HISTORY: \"Do you have any history of lung disease?\"  (e.g., pulmonary embolus, asthma, emphysema)      No   9. PE RISK FACTORS: \"Do you have a history of blood clots?\" (or: recent major surgery, recent prolonged travel, bedridden)        Factor 5 Leiden mutation     10. OTHER SYMPTOMS: \"Do you have any other symptoms?\" (e.g., runny nose, wheezing, chest pain)        Sore throat,fatigue,headache,sinus pressure ,chest pain after coughing   Patient unsure sob     11. PREGNANCY: \"Is there any chance you are pregnant?\" \"When was your last menstrual period?\"        No   12. TRAVEL: \"Have you traveled out of the country in the last month?\" (e.g., travel history, exposures)        No  No appts available at the office.Patient advised to be evaluated at the .    Protocols used: Cough-Adult-[place]    "

## 2025-02-07 NOTE — PATIENT INSTRUCTIONS
This appears to be viral illness and no antibiotic is indicated at this time.    Albuterol inhaler as needed.  Prescription sent to pharmacy for prednisone.  You do not have to take it but it is there if you find that you are having increased chest discomfort tightness or wheezing.    Take prednisone as instructed.  While on prednisone do not take any ibuprofen or ibuprofen like products.  May safely take Tylenol if needed.        Viral illnesses that can cause your symptoms can include (but are not limited to) enteroviruses, influenzas, Covid, non Covid coronaviruses, human metapneumoviruses, RSV, adenoviruses, rhinoviruses.      Many viral respiratory illnesses  can present similarly.  Most are self-limiting - meaning patients will get better on their own with time.   Antibiotics do not help viral illnesses.       Most upper respiratory symptoms start to improve after 7-12 days but may take a few weeks to completely resolve.        As with any respiratory illness, transmission precautions are strongly advised.  Masking.  Isolating.  Hand washing.  Frequent cleaning of common use surfaces.      Follow up with your PCP office if not starting to  improve over the next 7-10 days.  If you want to be proactive, you may contact your PCP office now to schedule follow up for near future.    If you feel like you are severely worsening or have significant weakness, chest pain, shortness of breath proceed to ER for immediate further evaluation.    ---------------------------------------------------------------------------------------------------------------------------------------------------------------------------------------------------------------------------------------------------------------    Strongly encourage getting plenty of rest over the next few days.  Increase your hydration.    Vaporizer by bedside.      Symptom Relief Suggestions:    Options for sore throat or hoarseness:              * Warm salt water  gargles every 1-2 hours while awake        * Throat lozenges, Tylenol, voice rest, warm tea with honey.    Options for sinus pressure, nasal congestion, runny nose, and / or post nasal drip:            * Clearing your sinuses in a nice steamy shower may be helpful, especially first thing after waking.       * Nasal saline rinses every 1-2 hours while awake may also help decrease nasal congestion, drainage.       * Afrin nasal spray if significant nasal congestion at bedtime may use .  (Do not use for over 3 days however.)       * Due to your history of high blood pressure and / or heart disease, we recommend that you avoid oral decongestants (decongestants can elevate blood pressure).       * Coricidin HPB  may be helpful for your sinus symptoms.        * Flonase nasal spray may be helpful.      Options for ear pressure, discomfort:         * Coricidin HPB.       * Flonase nasal spray.       * Warm compresses against ear(s) for comfort.    Options for help with  cough:         * Warm tea with honey or a teaspoon of honey periodically throughout day and / or before bed.       * Plain Mucinex (an expectorant to help keep mucus thin so you can clear it easier).       * Night time cough medications:  Mucinex DM (expectorant / cough suppressant) or other night time cough medication options include Delsym, Robitussin DM, NyQuil.         * Propping with an extra pillow or two may be helpful.       * Keep water by your bedside to sip on as needed.       * Cough drops.    -----------------------------------------------------------------------------------------------------------------------------------------------------------------------------------------------------------------------------------------------------------    Although bothersome, mucous is not necessarily a bad thing.  Production of mucous is the body's way of trying to capture and flush irritants from mucosal surfaces.  Yellow or green mucous does not  necessarily mean you have a bacterial infection.   Mucous will become more discolored over time, especially first thing in the morning, as your body's immune system also floods the mucosal surfaces with white bloods cells to try and help fight infection.  This white blood cell debride can also cause mucous to be discolored.  Again, using nasal saline spray frequently may help soothe and keep mucous flowing out versus getting dried, thickened and / or stuck leading to more sinus pain and pressure.       If you have a cough, please realize that a cough is not necessarily a bad thing but a way that your body tries to keep airways clear.  Phlegm may be more discolored in the morning.  Please note that discolored phlegm does not necessarily mean a bacterial infection.

## 2025-02-07 NOTE — PROGRESS NOTES
[facility]    NAME: Cassy Jacob is a 49 y.o. female  : 1975    MRN: [id number]  DATE: 2025  TIME: 4:22 PM    Assessment and Plan   Influenza-like illness [J11.1]  1. Influenza-like illness  albuterol (PROVENTIL HFA,VENTOLIN HFA) 90 mcg/act inhaler    predniSONE 20 mg tablet          Patient Instructions     Patient Instructions   This appears to be viral illness and no antibiotic is indicated at this time.    Albuterol inhaler as needed.  Prescription sent to pharmacy for prednisone.  You do not have to take it but it is there if you find that you are having increased chest discomfort tightness or wheezing.    Take prednisone as instructed.  While on prednisone do not take any ibuprofen or ibuprofen like products.  May safely take Tylenol if needed.        Viral illnesses that can cause your symptoms can include (but are not limited to) enteroviruses, influenzas, Covid, non Covid coronaviruses, human metapneumoviruses, RSV, adenoviruses, rhinoviruses.      Many viral respiratory illnesses  can present similarly.  Most are self-limiting - meaning patients will get better on their own with time.   Antibiotics do not help viral illnesses.       Most upper respiratory symptoms start to improve after 7-12 days but may take a few weeks to completely resolve.        As with any respiratory illness, transmission precautions are strongly advised.  Masking.  Isolating.  Hand washing.  Frequent cleaning of common use surfaces.      Follow up with your PCP office if not starting to  improve over the next 7-10 days.  If you want to be proactive, you may contact your PCP office now to schedule follow up for near future.    If you feel like you are severely worsening or have significant weakness, chest pain, shortness of breath proceed to ER for immediate further  evaluation.    ---------------------------------------------------------------------------------------------------------------------------------------------------------------------------------------------------------------------------------------------------------------    Strongly encourage getting plenty of rest over the next few days.  Increase your hydration.    Vaporizer by bedside.      Symptom Relief Suggestions:    Options for sore throat or hoarseness:              * Warm salt water gargles every 1-2 hours while awake        * Throat lozenges, Tylenol, voice rest, warm tea with honey.    Options for sinus pressure, nasal congestion, runny nose, and / or post nasal drip:            * Clearing your sinuses in a nice steamy shower may be helpful, especially first thing after waking.       * Nasal saline rinses every 1-2 hours while awake may also help decrease nasal congestion, drainage.       * Afrin nasal spray if significant nasal congestion at bedtime may use .  (Do not use for over 3 days however.)       * Due to your history of high blood pressure and / or heart disease, we recommend that you avoid oral decongestants (decongestants can elevate blood pressure).       * Coricidin HPB  may be helpful for your sinus symptoms.        * Flonase nasal spray may be helpful.      Options for ear pressure, discomfort:         * Coricidin HPB.       * Flonase nasal spray.       * Warm compresses against ear(s) for comfort.    Options for help with  cough:         * Warm tea with honey or a teaspoon of honey periodically throughout day and / or before bed.       * Plain Mucinex (an expectorant to help keep mucus thin so you can clear it easier).       * Night time cough medications:  Mucinex DM (expectorant / cough suppressant) or other night time cough medication options include Delsym, Robitussin DM, NyQuil.         * Propping with an extra pillow or two may be helpful.       * Keep water by your bedside to sip on  as needed.       * Cough drops.    -----------------------------------------------------------------------------------------------------------------------------------------------------------------------------------------------------------------------------------------------------------    Although bothersome, mucous is not necessarily a bad thing.  Production of mucous is the body's way of trying to capture and flush irritants from mucosal surfaces.  Yellow or green mucous does not necessarily mean you have a bacterial infection.   Mucous will become more discolored over time, especially first thing in the morning, as your body's immune system also floods the mucosal surfaces with white bloods cells to try and help fight infection.  This white blood cell debride can also cause mucous to be discolored.  Again, using nasal saline spray frequently may help soothe and keep mucous flowing out versus getting dried, thickened and / or stuck leading to more sinus pain and pressure.       If you have a cough, please realize that a cough is not necessarily a bad thing but a way that your body tries to keep airways clear.  Phlegm may be more discolored in the morning.  Please note that discolored phlegm does not necessarily mean a bacterial infection.                     Chief Complaint     Chief Complaint   Patient presents with    Cold Like Symptoms     Pt coming in with cold like s/s. Fever, chills, headache, bodty aches, coughing, congestion. Went to work today and has sinus pressure and pain in her chest from coughing. S/s started Tuesday night.        History of Present Illness   Cassy Jacob presents to the clinic c/o  49-year-old female comes in with fever chills headache cough congestion.    Started: About Tuesday night with headache and upset stomach.  Associated signs and symptoms: Then developed fever chills cough congestion postnasal drip.  Does have some tightness in her chest when she coughs.  She has had some  loose stools and upset stomach.  Modifying factors: Guaifenesin DM, Flonase and Mylanta.  Known Exposures: People at work have been sick.  Recent travel:  No.  Hx asthma:  No.  Hx pneumonia: Remote history pneumonia.  Smoker: No.    Wonders with chest feel little tight whether she should use an inhaler.  No longer has.            Review of Systems   Review of Systems   Constitutional:  Positive for activity change, appetite change, chills, diaphoresis, fatigue and fever.   HENT:  Positive for congestion, postnasal drip, rhinorrhea and sinus pressure. Negative for ear discharge, ear pain, sinus pain and sore throat.    Eyes: Negative.    Respiratory:  Positive for cough and chest tightness. Negative for shortness of breath and wheezing.    Cardiovascular: Negative.    Gastrointestinal:  Positive for abdominal pain, diarrhea and nausea.   Musculoskeletal:  Positive for myalgias.   Neurological:  Positive for headaches.   Hematological: Negative.        Current Medications     Long-Term Medications   Medication Sig Dispense Refill    lisinopril (ZESTRIL) 40 mg tablet Take 1 tablet (40 mg total) by mouth daily 30 tablet 5    multivitamin (THERAGRAN) TABS Take 1 tablet by mouth daily      naproxen (Naprosyn) 500 mg tablet Take 1 tablet (500 mg total) by mouth 2 (two) times a day with meals 60 tablet 0    [DISCONTINUED] polyethylene glycol (Golytely) 4000 mL solution Take 4,000 mL by mouth once for 1 dose Take 4000 mL by mouth once for 1 dose. Use as directed 4000 mL 0       Current Allergies     Allergies as of 02/07/2025 - Reviewed 02/07/2025   Allergen Reaction Noted    [place] oil - food allergy Itching, Tongue Swelling, and Facial Swelling 12/10/2015    [place] (diagnostic) - food allergy Hives and Itching 10/31/2023    Soybean oil - food allergy Itching 10/31/2023    Latex Hives, Itching, and Rash 12/10/2015          The following portions of the patient's history were reviewed and updated as appropriate: allergies,  current medications, past family history, past medical history, past social history, past surgical history and problem list.  Past Medical History:   Diagnosis Date    Allergic     Eva, soy, seasonal, Peanuts hurt my stomach     Allergic rhinitis     Anxiety 2004    Clotting disorder (HCC) 2021    Heavier periods    COVID-19 12/28/2021    Depression 2018    Factor 5 Leiden mutation, heterozygous (HCC)     Generalized headaches     More frequent around my period    Headache(784.0) 2018    Hypertension 2004    Low back pain 01/2017    Migraine     Mitral regurgitation 01/13/2022    Trace    Thyroid nodule     Tricuspid regurgitation 01/13/2022    Trace    Varicella      Past Surgical History:   Procedure Laterality Date    VAGINAL DELIVERY      WISDOM TOOTH EXTRACTION       Family History   Problem Relation Age of Onset    Hypothyroidism Mother     Breast cancer Mother 60    Thyroid disease Mother     Coronary artery disease Father     Diabetes Father     Clotting disorder Sister     Thrombosis Sister     No Known Problems Sister     No Known Problems Daughter     No Known Problems Maternal Grandmother     No Known Problems Maternal Grandfather     No Known Problems Paternal Grandmother     No Known Problems Paternal Grandfather     ADD / ADHD Son     Asthma Son     Throat cancer Maternal Aunt         age unknown    Melanoma Maternal Aunt 45    No Known Problems Maternal Aunt        Objective   /84   Pulse 94   Temp 98.6 °F (37 °C) (Tympanic)   Resp 16   Wt 97.1 kg (214 lb)   LMP 01/28/2025 (Approximate)   SpO2 98%   BMI 35.61 kg/m²   Patient's last menstrual period was 01/28/2025 (approximate).       Physical Exam     Physical Exam  Vitals and nursing note reviewed.   Constitutional:       General: She is not in acute distress.     Appearance: She is well-developed. She is ill-appearing. She is not toxic-appearing or diaphoretic.      Comments: Appears mildly ill but in no acute distress.  No trismus  or conversational dyspnea.   HENT:      Head: Normocephalic and atraumatic.      Right Ear: Tympanic membrane, ear canal and external ear normal.      Left Ear: Tympanic membrane, ear canal and external ear normal.      Nose: Congestion present. No rhinorrhea.      Mouth/Throat:      Mouth: Mucous membranes are moist.      Pharynx: No oropharyngeal exudate or posterior oropharyngeal erythema.      Comments: Cobblestoning posterior pharynx   Eyes:      General:         Right eye: No discharge.         Left eye: No discharge.      Conjunctiva/sclera: Conjunctivae normal.      Pupils: Pupils are equal, round, and reactive to light.   Cardiovascular:      Rate and Rhythm: Normal rate and regular rhythm.      Heart sounds: Normal heart sounds. No murmur heard.     No friction rub. No gallop.   Pulmonary:      Effort: Pulmonary effort is normal. No respiratory distress.      Breath sounds: Normal breath sounds. No stridor. No wheezing, rhonchi or rales.   Musculoskeletal:      Cervical back: Normal range of motion and neck supple. No rigidity or tenderness.   Lymphadenopathy:      Cervical: No cervical adenopathy.   Skin:     General: Skin is warm and dry.      Coloration: Skin is not jaundiced or pale.      Findings: No rash.   Neurological:      General: No focal deficit present.      Mental Status: She is alert and oriented to person, place, and time.   Psychiatric:         Mood and Affect: Mood normal.         Behavior: Behavior normal.

## 2025-02-10 ENCOUNTER — TELEPHONE (OUTPATIENT)
Age: 50
End: 2025-02-10

## 2025-02-10 NOTE — TELEPHONE ENCOUNTER
Rima dietician with [employer] sent a fax to get pt's last OV note sent. She received it but it did not have a diagnosis code of obesity. She said it the pt has a diagnosis of obesity then her nutrition services with the dietician will be covered. She is hoping to get any OV note with the obesity E code on it or have the doctor add it so it can be covered. The fax for her is [phone]. Please, look into this for dietician.

## 2025-02-17 ENCOUNTER — TELEPHONE (OUTPATIENT)
Dept: FAMILY MEDICINE CLINIC | Facility: CLINIC | Age: 50
End: 2025-02-17

## 2025-02-17 DIAGNOSIS — F41.9 ANXIETY: Primary | ICD-10-CM

## 2025-02-17 RX ORDER — ESCITALOPRAM OXALATE 10 MG/1
10 TABLET ORAL DAILY
Qty: 30 TABLET | Refills: 1 | Status: SHIPPED | OUTPATIENT
Start: 2025-02-17 | End: 2025-08-16

## 2025-02-17 NOTE — TELEPHONE ENCOUNTER
Joy Edmond, DO at 2/17/2025 12:27 PM    Status: Signed   Please have pt make f/u vv mood in 1 mo            Pt has an appt on 4/23/25 is this ok or you still want to see her in march?

## 2025-02-20 NOTE — TELEPHONE ENCOUNTER
Rima the dietician with [employer] called  they did not received the updated office note   could they be refaxed to  [phone]  thank you

## 2025-03-07 ENCOUNTER — OFFICE VISIT (OUTPATIENT)
Dept: FAMILY MEDICINE CLINIC | Facility: CLINIC | Age: 50
End: 2025-03-07
Payer: COMMERCIAL

## 2025-03-07 VITALS
TEMPERATURE: 98.3 F | SYSTOLIC BLOOD PRESSURE: 150 MMHG | DIASTOLIC BLOOD PRESSURE: 80 MMHG | BODY MASS INDEX: 35.16 KG/M2 | HEIGHT: 65 IN | WEIGHT: 211 LBS | OXYGEN SATURATION: 99 % | RESPIRATION RATE: 16 BRPM | HEART RATE: 93 BPM

## 2025-03-07 DIAGNOSIS — M25.571 ACUTE RIGHT ANKLE PAIN: ICD-10-CM

## 2025-03-07 DIAGNOSIS — I80.01 THROMBOPHLEBITIS OF SUPERFICIAL VEINS OF RIGHT LOWER EXTREMITY: Primary | ICD-10-CM

## 2025-03-07 PROCEDURE — 99214 OFFICE O/P EST MOD 30 MIN: CPT | Performed by: FAMILY MEDICINE

## 2025-03-07 NOTE — PROGRESS NOTES
"Name: Cassy Jacob      : 1975      MRN: [id number]  Encounter Provider: Joy Edmond DO  Encounter Date: 3/7/2025   Encounter department: [facility]  :  Assessment & Plan  Thrombophlebitis of superficial veins of right lower extremity  No alarming sx  Advised nsaids and compresses  ER or call if calf swelling, redness, or worsening calf pain         Acute right ankle pain  Advised calling Dr Goldman for eval              History of Present Illness   HPI  Pt noted a week L ankle swelling and pain.  Doesn' recall injury.  Then a few days ago, she noted an area of hardness in lateral lower leg.  Went to ED.  Records reviewed.  Duplex showed small area of superficial clot.  Xray showed enthesopathy at achilles tendon.  Pt has rested the ankle and is weight bearing better.  No increased redness, swelling, pain in calf.  No shortness of breath.  Taking naproxen for sx    Review of Systems  See hpi; all other systems negative    Objective   /80 (BP Location: Left arm, Patient Position: Sitting, Cuff Size: Large)   Pulse 93   Temp 98.3 °F (36.8 °C) (Tympanic)   Resp 16   Ht 5' 5\" (1.651 m)   Wt 95.7 kg (211 lb)   SpO2 99%   BMI 35.11 kg/m²      Physical Exam  Constitutional:       Appearance: Normal appearance.   HENT:      Head: Normocephalic and atraumatic.      Right Ear: External ear normal.      Left Ear: External ear normal.   Eyes:      Extraocular Movements: Extraocular movements intact.      Conjunctiva/sclera: Conjunctivae normal.   Cardiovascular:      Rate and Rhythm: Normal rate and regular rhythm.      Heart sounds: No murmur heard.     No friction rub. No gallop.   Pulmonary:      Effort: Pulmonary effort is normal.      Breath sounds: Normal breath sounds. No wheezing, rhonchi or rales.   Abdominal:      General: Bowel sounds are normal.      Palpations: Abdomen is soft.   Musculoskeletal:      Comments: R small vessel hardening medial lower leg.  Negative rose marie's " sign.  No calf tenderness.  No redness or edema.  Mild ankle swelling.       Neurological:      Mental Status: She is alert.

## 2025-03-18 ENCOUNTER — TELEMEDICINE (OUTPATIENT)
Dept: FAMILY MEDICINE CLINIC | Facility: CLINIC | Age: 50
End: 2025-03-18
Payer: COMMERCIAL

## 2025-03-18 DIAGNOSIS — F41.9 ANXIETY: Primary | ICD-10-CM

## 2025-03-18 PROCEDURE — 99213 OFFICE O/P EST LOW 20 MIN: CPT | Performed by: FAMILY MEDICINE

## 2025-03-19 NOTE — ASSESSMENT & PLAN NOTE
For now, may continue lexapro and see if things improve.  If not, then could consider switch to prozac

## 2025-03-19 NOTE — PROGRESS NOTES
Virtual Regular VisitName: Cassy Jacob      : 1975      MRN: [id number]  Encounter Provider: Joy Edmond DO  Encounter Date: 3/18/2025   Encounter department: [facility]  :  Assessment & Plan  Anxiety  For now, may continue lexapro and see if things improve.  If not, then could consider switch to prozac             History of Present Illness     HPI  Pt presents in f/u.  Now on lexapro 10 and feeling less anxious.  More tired on the 10mg dose.  Had been okay at 5 but with anxiety breakthrough.  Feels like this is a good dose for her.  No panic.  Anxiety improved.  No si/hi.  No depression.  Didn't do well on zoloft in the past.    Review of Systems  See hpi    Objective   There were no vitals taken for this visit.    Physical Exam  Constitutional:       Appearance: Normal appearance.   HENT:      Head: Normocephalic and atraumatic.   Eyes:      Extraocular Movements: Extraocular movements intact.      Conjunctiva/sclera: Conjunctivae normal.   Pulmonary:      Effort: Pulmonary effort is normal. No respiratory distress.   Neurological:      General: No focal deficit present.      Mental Status: She is alert and oriented to person, place, and time.   Psychiatric:         Mood and Affect: Mood normal.         Behavior: Behavior normal.         Thought Content: Thought content normal.         Judgment: Judgment normal.         Administrative Statements   Encounter provider Joy Edmond DO    The Patient is located at Home and in the following state in which I hold an active license [place].    The patient was identified by name and date of birth. Cassy Jacob was informed that this is a telemedicine visit and that the visit is being conducted through the Epic Embedded platform. She agrees to proceed..  My office door was closed. No one else was in the room.  She acknowledged consent and understanding of privacy and security of the video platform. The patient has agreed to participate and  understands they can discontinue the visit at any time.    I have spent a total time of 5 minutes in caring for this patient on the day of the visit/encounter including Risks and benefits of tx options and Instructions for management, not including the time spent for establishing the audio/video connection.

## 2025-04-15 DIAGNOSIS — F41.9 ANXIETY: ICD-10-CM

## 2025-04-16 RX ORDER — ESCITALOPRAM OXALATE 10 MG/1
10 TABLET ORAL DAILY
Qty: 30 TABLET | Refills: 1 | Status: SHIPPED | OUTPATIENT
Start: 2025-04-16

## 2025-04-22 ENCOUNTER — RESULTS FOLLOW-UP (OUTPATIENT)
Dept: FAMILY MEDICINE CLINIC | Facility: CLINIC | Age: 50
End: 2025-04-22

## 2025-04-22 LAB
ALBUMIN SERPL-MCNC: 4.2 G/DL (ref 3.6–5.1)
ALBUMIN/GLOB SERPL: 1.6 (CALC) (ref 1–2.5)
ALP SERPL-CCNC: 44 U/L (ref 31–125)
ALT SERPL-CCNC: 17 U/L (ref 6–29)
AST SERPL-CCNC: 15 U/L (ref 10–35)
BILIRUB SERPL-MCNC: 0.3 MG/DL (ref 0.2–1.2)
BUN SERPL-MCNC: 12 MG/DL (ref 7–25)
BUN/CREAT SERPL: ABNORMAL (CALC) (ref 6–22)
CALCIUM SERPL-MCNC: 9.1 MG/DL (ref 8.6–10.2)
CHLORIDE SERPL-SCNC: 106 MMOL/L (ref 98–110)
CHOLEST SERPL-MCNC: 181 MG/DL
CHOLEST/HDLC SERPL: 5 (CALC)
CO2 SERPL-SCNC: 30 MMOL/L (ref 20–32)
CREAT SERPL-MCNC: 0.9 MG/DL (ref 0.5–0.99)
GFR/BSA.PRED SERPLBLD CYS-BASED-ARV: 78 ML/MIN/1.73M2
GLOBULIN SER CALC-MCNC: 2.6 G/DL (CALC) (ref 1.9–3.7)
GLUCOSE SERPL-MCNC: 117 MG/DL (ref 65–99)
HBA1C MFR BLD: 6.1 %
HDLC SERPL-MCNC: 36 MG/DL
LDLC SERPL CALC-MCNC: 116 MG/DL (CALC)
NONHDLC SERPL-MCNC: 145 MG/DL (CALC)
POTASSIUM SERPL-SCNC: 4 MMOL/L (ref 3.5–5.3)
PROT SERPL-MCNC: 6.8 G/DL (ref 6.1–8.1)
SODIUM SERPL-SCNC: 141 MMOL/L (ref 135–146)
TRIGL SERPL-MCNC: 171 MG/DL

## 2025-04-23 ENCOUNTER — OFFICE VISIT (OUTPATIENT)
Dept: FAMILY MEDICINE CLINIC | Facility: CLINIC | Age: 50
End: 2025-04-23
Payer: COMMERCIAL

## 2025-04-23 VITALS
WEIGHT: 213 LBS | TEMPERATURE: 98.1 F | OXYGEN SATURATION: 100 % | SYSTOLIC BLOOD PRESSURE: 134 MMHG | HEIGHT: 65 IN | BODY MASS INDEX: 35.49 KG/M2 | HEART RATE: 88 BPM | DIASTOLIC BLOOD PRESSURE: 82 MMHG

## 2025-04-23 DIAGNOSIS — F41.9 ANXIETY: ICD-10-CM

## 2025-04-23 DIAGNOSIS — E66.01 CLASS 2 SEVERE OBESITY WITH SERIOUS COMORBIDITY AND BODY MASS INDEX (BMI) OF 35.0 TO 35.9 IN ADULT, UNSPECIFIED OBESITY TYPE (HCC): ICD-10-CM

## 2025-04-23 DIAGNOSIS — D68.51 FACTOR 5 LEIDEN MUTATION, HETEROZYGOUS (HCC): ICD-10-CM

## 2025-04-23 DIAGNOSIS — Z00.00 ANNUAL PHYSICAL EXAM: Primary | ICD-10-CM

## 2025-04-23 DIAGNOSIS — E66.812 CLASS 2 SEVERE OBESITY WITH SERIOUS COMORBIDITY AND BODY MASS INDEX (BMI) OF 35.0 TO 35.9 IN ADULT, UNSPECIFIED OBESITY TYPE (HCC): ICD-10-CM

## 2025-04-23 DIAGNOSIS — G43.009 MIGRAINE WITHOUT AURA AND WITHOUT STATUS MIGRAINOSUS, NOT INTRACTABLE: ICD-10-CM

## 2025-04-23 DIAGNOSIS — R73.01 IFG (IMPAIRED FASTING GLUCOSE): ICD-10-CM

## 2025-04-23 DIAGNOSIS — E78.2 MIXED HYPERLIPIDEMIA: ICD-10-CM

## 2025-04-23 PROCEDURE — 99396 PREV VISIT EST AGE 40-64: CPT | Performed by: FAMILY MEDICINE

## 2025-04-23 PROCEDURE — 99214 OFFICE O/P EST MOD 30 MIN: CPT | Performed by: FAMILY MEDICINE

## 2025-04-23 RX ORDER — TIRZEPATIDE 2.5 MG/.5ML
2.5 INJECTION, SOLUTION SUBCUTANEOUS WEEKLY
Qty: 2 ML | Refills: 0 | Status: SHIPPED | OUTPATIENT
Start: 2025-04-23 | End: 2025-05-21

## 2025-04-23 NOTE — PROGRESS NOTES
Name: Cassy Jacob      : 1975      MRN: [id number]  Encounter Provider: Joy Edmond DO  Encounter Date: 2025   Encounter department: [facility]  :  Assessment & Plan  Annual physical exam  Declines covid shot  Otherwise up to date preventively       Class 2 severe obesity with serious comorbidity and body mass index (BMI) of 35.0 to 35.9 in adult, unspecified obesity type (HCC)  Prior Authorization Clinical Questions for Weight Management Pharmacotherapy    1. Does the patient have a contrainidcation to medication prescribed for weight management?: No  2. Does the patient have a diagnosis of obesity, confirmed by a BMI greater than or equal to 30 kg/m^2?: Yes  3. Does the patient have a BMI of greater than or equal to 27 kg/m^2 with at least one weight-related comorbidity/risk factor/complication (e.g. diabetes, dyslipidemia, coronary artery disease)?: Yes  4. Weight-related co-morbidities/risk factors: prediabetes, dyslipidemia, hypertension  5. WEGOVY CVA Indication: Does patient have established documented cardiovascular disease (history of a prior heart attack (myocardial infarction), stroke, or symptomatic peripheral arterial disease (PAD)?: No  6. ZEPBOUND BRUNO Indication: Does patient have documented BRUNO diagnosed via sleep study (insurance will require copy of sleep study results for approval)?: No  7. Has the patient been on a weight loss regimen of low-calorie diet, increased physical activity, and lifestyle modifications for a minimum of 6 months?: Yes  8. Has the patient completed a comprehensive weight loss program (ie, Weight Watchers, Noom, Bariatrics, other krystian on phone)? If so, what?: No  9. Does the patient have a history of type 2 diabetes?: No  10. Has the member tried and failed other weight loss medication within the past 12 months?: No  11. Will the member use requested medication in combination with another GLP agonist or weight loss drug?: No  12. Is the  medication a controlled substance?: No     Baseline weight (in pounds): 213 lbs       Advised diet/exercise which she has already been doing to no avail  Given her obesity and other comorbidities, she is an excellent candidate for glp-1  Start zepbound weekly  F/u 3 mo    Orders:    tirzepatide (Zepbound) 2.5 mg/0.5 mL auto-injector; Inject 0.5 mL (2.5 mg total) under the skin once a week for 28 days    Factor 5 Leiden mutation, heterozygous (HCC)  No hx of clotting  No a/c       Mixed hyperlipidemia  Low 10 yr risk, so no meds at this time  Recommend dietary modification and weight loss       Migraine without aura and without status migrainosus, not intractable  Well-controlled on prn nurtec  Continue trish       IFG (impaired fasting glucose)  Advised weight loss and continued monitoring       Anxiety  Well-controlled on lexapro  Continue same              History of Present Illness   HPI  Pt presents in for physical exam.  Preventively, pt up to date on mammogram.  Could get covid booster but is up to date on all other imm's.  Exercises regularly.  Sees GYN.  Up to date on crc screening.  Doesn't exercise currently.    Most recent labs show:  Lab Results   Component Value Date    CHOLESTEROL 181 04/21/2025    CHOLESTEROL 195 07/23/2024    CHOLESTEROL 194 02/03/2023     Lab Results   Component Value Date    HDL 36 (L) 04/21/2025    HDL 32 (L) 07/23/2024    HDL 41 (L) 02/03/2023     Lab Results   Component Value Date    TRIG 171 (H) 04/21/2025    TRIG 265 (H) 07/23/2024    TRIG 142 02/03/2023     Lab Results   Component Value Date    NONHDLC 153 02/03/2023    NONHDLC 170 01/13/2022     Lab Results   Component Value Date    LDLCALC 116 (H) 04/21/2025       Lab Results   Component Value Date    HGBA1C 6.1 (H) 04/21/2025     Lab Results   Component Value Date    SODIUM 141 04/21/2025    K 4.0 04/21/2025     04/21/2025    CO2 30 04/21/2025    AGAP 4 02/03/2023    BUN 12 04/21/2025    CREATININE 0.90 04/21/2025     "GLUC 117 (H) 04/21/2025    GLUF 103 (H) 02/03/2023    CALCIUM 9.1 04/21/2025    AST 15 04/21/2025    ALT 17 04/21/2025    ALKPHOS 44 04/21/2025    TP 6.8 04/21/2025    TBILI 0.3 04/21/2025    EGFR 78 04/21/2025     From a f/u standpoint, pt has hx of ifg.  Trying to limit cabs and exercise when able, but she is gaining weight regardless.      Hx HTN.  Controlled on lisinopril.    Cholesterol elevated as above.  10 yr risk 2.5%, so no need for meds currently.      Migraines are well-controlled on prn nurtec.  Only 1-2/mo    Pt's mood is well-controlled on lexapro 10mg.  She would like to continue same.  No unusual anxiety or depression      Review of Systems   Constitutional:  Negative for chills, fatigue, fever and unexpected weight change.   HENT:  Negative for congestion, ear pain, hearing loss, postnasal drip, rhinorrhea, sinus pressure, sinus pain, sore throat, trouble swallowing and voice change.    Eyes:  Negative for pain, redness and visual disturbance.   Respiratory:  Negative for cough and shortness of breath.    Cardiovascular:  Negative for chest pain, palpitations and leg swelling.   Gastrointestinal:  Negative for abdominal pain, constipation, diarrhea and nausea.   Endocrine: Negative for cold intolerance, heat intolerance, polydipsia and polyuria.   Genitourinary:  Negative for dysuria, frequency and urgency.   Musculoskeletal:  Negative for arthralgias, joint swelling and myalgias.   Skin:  Negative for rash.        No suspicious lesions   Neurological:  Negative for weakness, numbness and headaches.   Hematological:  Negative for adenopathy.       Objective   /82 (BP Location: Left arm, Patient Position: Sitting, Cuff Size: Large)   Pulse 88   Temp 98.1 °F (36.7 °C) (Temporal)   Ht 5' 5\" (1.651 m)   Wt 96.6 kg (213 lb)   SpO2 100%   BMI 35.45 kg/m²      Physical Exam  Constitutional:       Appearance: Normal appearance.   HENT:      Head: Normocephalic and atraumatic.      Right Ear: " Tympanic membrane, ear canal and external ear normal.      Left Ear: Tympanic membrane, ear canal and external ear normal.      Nose: Nose normal. No congestion.      Mouth/Throat:      Mouth: Mucous membranes are moist.      Pharynx: No oropharyngeal exudate or posterior oropharyngeal erythema.   Eyes:      Extraocular Movements: Extraocular movements intact.      Conjunctiva/sclera: Conjunctivae normal.      Pupils: Pupils are equal, round, and reactive to light.   Neck:      Vascular: No carotid bruit.   Cardiovascular:      Rate and Rhythm: Normal rate and regular rhythm.      Heart sounds: No murmur heard.     No friction rub. No gallop.   Pulmonary:      Effort: Pulmonary effort is normal.      Breath sounds: No wheezing, rhonchi or rales.   Abdominal:      General: Abdomen is flat. There is no distension.      Palpations: Abdomen is soft.      Tenderness: There is no abdominal tenderness.   Musculoskeletal:      Cervical back: Neck supple.   Lymphadenopathy:      Cervical: No cervical adenopathy.   Neurological:      General: No focal deficit present.      Mental Status: She is alert and oriented to person, place, and time.      Cranial Nerves: No cranial nerve deficit.      Motor: No weakness.      Deep Tendon Reflexes: Reflexes normal.   Psychiatric:         Mood and Affect: Mood normal.         Behavior: Behavior normal.

## 2025-04-23 NOTE — PATIENT INSTRUCTIONS
Zepbound 2.5mg weekly  Send me a message after 3rd shot telling me how you're doing and whether you want to go up.  F/u 3 mo

## 2025-05-14 NOTE — PROGRESS NOTES
Name: Cassy Jacob      : 1975      MRN: [id number]  Encounter Provider: ELEAZAR Morel  Encounter Date: 2025   Encounter department: [facility]  :  Assessment & Plan  Encounter for gynecological examination (general) (routine) without abnormal findings  Advised monthly SBE, annual CBE and  continued annual mammography. Reviewed ASCCP guidelines and recommended pap with cotesting q3 yrs for this low risk patient. STI testing was offered and the patient declines; she reports low risk. The patient desires to continue current contraceptive method, Nexplanon.  Diet/activity recommendations - Calcium 1200 mg daily and Vit D 600-100 IU daily.  RTO in one year or sooner PRN.         Screening mammogram for breast cancer    Orders:    Mammo screening bilateral w 3d and cad; Future    Pap smear for cervical cancer screening    Orders:    Liquid-based pap, screening        History of Present Illness   This patient presents for routine annual gyn exam.   Hx of Factor V  Family hx of White. Pt states she has tested negative.  Hx of thyroid nodules at age 37. Has had u/s and benign bx  Nexplanon inserted 10/30/24  to treat hormonal fluctuations with perimenopause and pt is pleased with improvement. Menses - occasional spotting.   She denies pelvic pain, dyspareunia, breast concerns, abnormal discharge, bowel/bladder dysfunction, depression/anxiety.   Pap/HPV normal, 21.  Last Mammography normal, 25. Colonoscopy 22, 5 yrs   and monogamous, 25 yrs. She denies STI concerns.                  Review of Systems   Constitutional: Negative.    Respiratory: Negative.     Cardiovascular: Negative.    Gastrointestinal: Negative.    Endocrine: Negative.    Genitourinary:  Negative for dyspareunia, dysuria, frequency, pelvic pain, urgency, vaginal bleeding, vaginal discharge and vaginal pain.   Musculoskeletal: Negative.    Skin: Negative.    Neurological:  "Negative.    Psychiatric/Behavioral: Negative.            Objective   /80 (BP Location: Right arm, Patient Position: Sitting, Cuff Size: Standard)   Ht 5' 5\" (1.651 m)   Wt 94.8 kg (209 lb)   BMI 34.78 kg/m²      Physical Exam  Vitals and nursing note reviewed.   Constitutional:       General: She is not in acute distress.     Appearance: She is well-developed.   HENT:      Head: Normocephalic and atraumatic.     Eyes:      Conjunctiva/sclera: Conjunctivae normal.       Cardiovascular:      Rate and Rhythm: Normal rate and regular rhythm.      Heart sounds: No murmur heard.  Pulmonary:      Effort: Pulmonary effort is normal. No respiratory distress.      Breath sounds: Normal breath sounds.   Chest:   Breasts:     Right: No swelling, bleeding, inverted nipple, mass, nipple discharge, skin change or tenderness.      Left: No swelling, bleeding, inverted nipple, mass, nipple discharge, skin change or tenderness.   Abdominal:      Palpations: Abdomen is soft.      Tenderness: There is no abdominal tenderness.   Genitourinary:     General: Normal vulva.      Exam position: Supine.      Pubic Area: No rash.       Labia:         Right: No rash, tenderness, lesion or injury.         Left: No rash, tenderness, lesion or injury.       Urethra: No urethral pain, urethral swelling or urethral lesion.     Musculoskeletal:         General: No swelling.      Cervical back: Neck supple.     Skin:     General: Skin is warm and dry.      Capillary Refill: Capillary refill takes less than 2 seconds.     Neurological:      Mental Status: She is alert.     Psychiatric:         Mood and Affect: Mood normal.           "

## 2025-05-15 DIAGNOSIS — G43.009 MIGRAINE WITHOUT AURA AND WITHOUT STATUS MIGRAINOSUS, NOT INTRACTABLE: ICD-10-CM

## 2025-05-16 ENCOUNTER — ANNUAL EXAM (OUTPATIENT)
Dept: GYNECOLOGY | Facility: CLINIC | Age: 50
End: 2025-05-16
Payer: COMMERCIAL

## 2025-05-16 VITALS
BODY MASS INDEX: 34.82 KG/M2 | HEIGHT: 65 IN | DIASTOLIC BLOOD PRESSURE: 80 MMHG | WEIGHT: 209 LBS | SYSTOLIC BLOOD PRESSURE: 150 MMHG

## 2025-05-16 DIAGNOSIS — Z12.4 PAP SMEAR FOR CERVICAL CANCER SCREENING: ICD-10-CM

## 2025-05-16 DIAGNOSIS — Z12.31 SCREENING MAMMOGRAM FOR BREAST CANCER: ICD-10-CM

## 2025-05-16 DIAGNOSIS — Z01.419 ENCOUNTER FOR GYNECOLOGICAL EXAMINATION (GENERAL) (ROUTINE) WITHOUT ABNORMAL FINDINGS: Primary | ICD-10-CM

## 2025-05-16 PROCEDURE — S0612 ANNUAL GYNECOLOGICAL EXAMINA: HCPCS | Performed by: OBSTETRICS & GYNECOLOGY

## 2025-05-16 PROCEDURE — G0476 HPV COMBO ASSAY CA SCREEN: HCPCS | Performed by: OBSTETRICS & GYNECOLOGY

## 2025-05-16 PROCEDURE — G0145 SCR C/V CYTO,THINLAYER,RESCR: HCPCS | Performed by: OBSTETRICS & GYNECOLOGY

## 2025-05-16 RX ORDER — AMOXICILLIN 500 MG/1
500 CAPSULE ORAL 3 TIMES DAILY
COMMUNITY
Start: 2025-05-09

## 2025-05-16 RX ORDER — RIMEGEPANT SULFATE 75 MG/75MG
TABLET, ORALLY DISINTEGRATING ORAL
Qty: 8 TABLET | Refills: 0 | Status: SHIPPED | OUTPATIENT
Start: 2025-05-16

## 2025-05-16 RX ORDER — CHLORHEXIDINE GLUCONATE ORAL RINSE 1.2 MG/ML
SOLUTION DENTAL
COMMUNITY
Start: 2025-04-24

## 2025-05-21 ENCOUNTER — RESULTS FOLLOW-UP (OUTPATIENT)
Dept: GYNECOLOGY | Facility: CLINIC | Age: 50
End: 2025-05-21

## 2025-05-21 LAB
LAB AP GYN PRIMARY INTERPRETATION: NORMAL
Lab: NORMAL

## 2025-05-27 DIAGNOSIS — E66.812 CLASS 2 SEVERE OBESITY WITH SERIOUS COMORBIDITY AND BODY MASS INDEX (BMI) OF 35.0 TO 35.9 IN ADULT, UNSPECIFIED OBESITY TYPE (HCC): Primary | ICD-10-CM

## 2025-05-27 DIAGNOSIS — E66.01 CLASS 2 SEVERE OBESITY WITH SERIOUS COMORBIDITY AND BODY MASS INDEX (BMI) OF 35.0 TO 35.9 IN ADULT, UNSPECIFIED OBESITY TYPE (HCC): Primary | ICD-10-CM

## 2025-05-27 RX ORDER — TIRZEPATIDE 5 MG/.5ML
5 INJECTION, SOLUTION SUBCUTANEOUS WEEKLY
Qty: 2 ML | Refills: 0 | Status: SHIPPED | OUTPATIENT
Start: 2025-05-27

## 2025-06-22 DIAGNOSIS — I10 ESSENTIAL HYPERTENSION: ICD-10-CM

## 2025-06-23 DIAGNOSIS — E66.01 CLASS 2 SEVERE OBESITY WITH SERIOUS COMORBIDITY AND BODY MASS INDEX (BMI) OF 35.0 TO 35.9 IN ADULT, UNSPECIFIED OBESITY TYPE (HCC): Primary | ICD-10-CM

## 2025-06-23 DIAGNOSIS — E66.812 CLASS 2 SEVERE OBESITY WITH SERIOUS COMORBIDITY AND BODY MASS INDEX (BMI) OF 35.0 TO 35.9 IN ADULT, UNSPECIFIED OBESITY TYPE (HCC): Primary | ICD-10-CM

## 2025-06-23 RX ORDER — LISINOPRIL 40 MG/1
40 TABLET ORAL DAILY
Qty: 30 TABLET | Refills: 5 | Status: SHIPPED | OUTPATIENT
Start: 2025-06-23

## 2025-06-23 RX ORDER — TIRZEPATIDE 7.5 MG/.5ML
7.5 INJECTION, SOLUTION SUBCUTANEOUS WEEKLY
Qty: 2 ML | Refills: 0 | Status: SHIPPED | OUTPATIENT
Start: 2025-06-23

## 2025-07-29 DIAGNOSIS — E66.812 CLASS 2 SEVERE OBESITY WITH SERIOUS COMORBIDITY AND BODY MASS INDEX (BMI) OF 35.0 TO 35.9 IN ADULT, UNSPECIFIED OBESITY TYPE (HCC): ICD-10-CM

## 2025-07-29 DIAGNOSIS — E66.01 CLASS 2 SEVERE OBESITY WITH SERIOUS COMORBIDITY AND BODY MASS INDEX (BMI) OF 35.0 TO 35.9 IN ADULT, UNSPECIFIED OBESITY TYPE (HCC): ICD-10-CM

## 2025-07-29 RX ORDER — TIRZEPATIDE 7.5 MG/.5ML
7.5 INJECTION, SOLUTION SUBCUTANEOUS WEEKLY
Qty: 2 ML | Refills: 0 | Status: SHIPPED | OUTPATIENT
Start: 2025-07-29 | End: 2025-08-05

## 2025-08-05 ENCOUNTER — OFFICE VISIT (OUTPATIENT)
Dept: FAMILY MEDICINE CLINIC | Facility: CLINIC | Age: 50
End: 2025-08-05
Payer: COMMERCIAL

## 2025-08-05 VITALS
RESPIRATION RATE: 16 BRPM | TEMPERATURE: 97.8 F | HEIGHT: 65 IN | HEART RATE: 92 BPM | DIASTOLIC BLOOD PRESSURE: 76 MMHG | OXYGEN SATURATION: 99 % | WEIGHT: 192.2 LBS | SYSTOLIC BLOOD PRESSURE: 134 MMHG | BODY MASS INDEX: 32.02 KG/M2

## 2025-08-05 DIAGNOSIS — E66.9 OBESITY (BMI 30-39.9): Primary | ICD-10-CM

## 2025-08-05 DIAGNOSIS — E16.2 HYPOGLYCEMIA: ICD-10-CM

## 2025-08-05 PROCEDURE — 99213 OFFICE O/P EST LOW 20 MIN: CPT | Performed by: FAMILY MEDICINE

## 2025-08-05 RX ORDER — TIRZEPATIDE 5 MG/.5ML
5 INJECTION, SOLUTION SUBCUTANEOUS WEEKLY
Qty: 2 ML | Refills: 0 | Status: SHIPPED | OUTPATIENT
Start: 2025-08-05